# Patient Record
Sex: MALE | Race: WHITE | NOT HISPANIC OR LATINO | Employment: FULL TIME | URBAN - METROPOLITAN AREA
[De-identification: names, ages, dates, MRNs, and addresses within clinical notes are randomized per-mention and may not be internally consistent; named-entity substitution may affect disease eponyms.]

---

## 2018-01-11 ENCOUNTER — OFFICE VISIT (OUTPATIENT)
Dept: URGENT CARE | Facility: CLINIC | Age: 28
End: 2018-01-11
Payer: COMMERCIAL

## 2018-01-11 VITALS
HEIGHT: 77 IN | RESPIRATION RATE: 16 BRPM | TEMPERATURE: 98.2 F | OXYGEN SATURATION: 98 % | BODY MASS INDEX: 24.21 KG/M2 | SYSTOLIC BLOOD PRESSURE: 124 MMHG | WEIGHT: 205 LBS | DIASTOLIC BLOOD PRESSURE: 76 MMHG | HEART RATE: 72 BPM

## 2018-01-11 DIAGNOSIS — K52.9 GASTROENTERITIS: Primary | ICD-10-CM

## 2018-01-11 PROCEDURE — 99204 OFFICE O/P NEW MOD 45 MIN: CPT | Performed by: PHYSICIAN ASSISTANT

## 2018-01-11 RX ORDER — PROMETHAZINE HYDROCHLORIDE 25 MG/1
25 TABLET ORAL EVERY 6 HOURS PRN
Qty: 30 TAB | Refills: 0 | Status: SHIPPED | OUTPATIENT
Start: 2018-01-11 | End: 2023-06-06

## 2018-01-11 RX ORDER — DICYCLOMINE HCL 20 MG
20 TABLET ORAL EVERY 6 HOURS
Qty: 20 TAB | Refills: 0 | Status: SHIPPED | OUTPATIENT
Start: 2018-01-11 | End: 2018-01-16

## 2018-01-11 NOTE — PROGRESS NOTES
Subjective:      PT is a 27 y.o. male who presents with Emesis (diarrhea since monday, usually in am and pm )            HPI  This is a new problem. The current episode started in the past 4 days. The problem occurs 2 to 4 times per day. The problem has been gradually worsening. The stool consistency is described as watery. The patient states that diarrhea awakens him from sleep. Nothing aggravates the symptoms. Risk factors include suspect food intake at [facility]. He has tried anti-motility drug for the symptoms. The treatment provided no relief. There is no history of bowel resection, inflammatory bowel disease, irritable bowel syndrome, malabsorption, a recent abdominal surgery or short gut syndrome.   Pt states for the last 4 days, they have had abd cramping, watery diarrhea, and nausea with vomiting. Pt denies blood or mucus in the stool. Pt suspects contaminated food as etiology. Pt states OTC Pepto is not helping.  Pt has not taken any Rx medications for this condition. PT states the pain is a 6/10, aching in nature and worse at night. Pt denies CP, SOB,  paresthesias, headaches, dizziness, change in vision, hives, or joint pain. The pt's medication list, problem list, and allergies have been evaluated and reviewed during today's visit.     PMH:  Negative per pt.      PSH:  Negative per pt.      Fam Hx:    Mother alive and well with no major medical  Issues        Soc HX:  Social History     Social History   • Marital status: Single     Spouse name: N/A   • Number of children: N/A   • Years of education: N/A     Occupational History   • Not on file.     Social History Main Topics   • Smoking status: Never Smoker   • Smokeless tobacco: Never Used   • Alcohol use No   • Drug use:      Types: Marijuana      Comment: medical marijuana    • Sexual activity: Not on file     Other Topics Concern   • Not on file     Social History Narrative   • No narrative on file         Medications:    Current  "Outpatient Prescriptions:   •  promethazine (PHENERGAN) 25 MG Tab, Take 1 Tab by mouth every 6 hours as needed for Nausea/Vomiting., Disp: 30 Tab, Rfl: 0  •  dicyclomine (BENTYL) 20 MG Tab, Take 1 Tab by mouth every 6 hours for 5 days., Disp: 20 Tab, Rfl: 0      Allergies:  Patient has no known allergies.    ROS  Constitutional: Negative for fever, chills and malaise/fatigue.   HENT: Negative for congestion and sore throat.    Eyes: Negative for blurred vision, double vision and photophobia.   Respiratory: Negative for cough and shortness of breath.    Cardiovascular: Negative for chest pain and palpitations.   Gastrointestinal: POS nausea, vomiting, abdominal pain, diarrhea.   Genitourinary: Negative for dysuria and flank pain.   Musculoskeletal: Negative for joint pain and myalgias.   Skin: Negative for itching and rash.   Neurological: Negative for dizziness, tingling and headaches.   Endo/Heme/Allergies: Does not bruise/bleed easily.   Psychiatric/Behavioral: Negative for depression. The patient is not nervous/anxious.           Objective:     /76   Pulse 72   Temp 36.8 °C (98.2 °F)   Resp 16   Ht 1.956 m (6' 5\")   Wt 93 kg (205 lb)   SpO2 98%   BMI 24.31 kg/m²      Physical Exam   Abdominal: Soft. Normal appearance. He exhibits no shifting dullness, no distension, no pulsatile liver, no fluid wave, no abdominal bruit, no ascites, no pulsatile midline mass and no mass. Bowel sounds are increased. There is no hepatosplenomegaly. There is generalized tenderness. There is no rigidity, no rebound, no guarding, no CVA tenderness, no tenderness at McBurney's point and negative Fuentes's sign. No hernia.             Constitutional: PT is oriented to person, place, and time. PT appears well-developed and well-nourished. No distress.   HENT:   Head: Normocephalic and atraumatic.   Mouth/Throat: Oropharynx is clear and moist. No oropharyngeal exudate.   Eyes: Conjunctivae normal and EOM are normal. Pupils are " equal, round, and reactive to light.   Neck: Normal range of motion. Neck supple. No thyromegaly present.   Cardiovascular: Normal rate, regular rhythm, normal heart sounds and intact distal pulses.  Exam reveals no gallop and no friction rub.    No murmur heard.  Pulmonary/Chest: Effort normal and breath sounds normal. No respiratory distress. PT has no wheezes. PT has no rales. Pt exhibits no tenderness.   Musculoskeletal: Normal range of motion. PT exhibits no edema and no tenderness.   Neurological: PT is alert and oriented to person, place, and time. PT has normal reflexes. No cranial nerve deficit.   Skin: Skin is warm and dry. No rash noted. PT is not diaphoretic. No erythema.       Psychiatric: PT has a normal mood and affect. PT behavior is normal. Judgment and thought content normal.          Assessment/Plan:     1. Gastroenteritis    - promethazine (PHENERGAN) 25 MG Tab; Take 1 Tab by mouth every 6 hours as needed for Nausea/Vomiting.  Dispense: 30 Tab; Refill: 0  - dicyclomine (BENTYL) 20 MG Tab; Take 1 Tab by mouth every 6 hours for 5 days.  Dispense: 20 Tab; Refill: 0    Rest, fluids encouraged.  Encouraged bland diet  AVS with medical info given.  Pt was in full understanding and agreement with the plan.  Follow-up as needed if symptoms worsen or fail to improve.

## 2018-01-11 NOTE — LETTER
January 11, 2018       Patient: David Meyer   YOB: 1990   Date of Visit: 1/11/2018         To Whom It May Concern:    It is my medical opinion that David Meyer may be excused from work for the date of 1/12/18.      If you have any questions or concerns, please don't hesitate to call [phone]          Sincerely,          Sarmad Urena P.A.-C.  Electronically Signed

## 2018-01-11 NOTE — PATIENT INSTRUCTIONS
Norovirus Infection  A norovirus infection is caused by exposure to a virus in a group of similar viruses (noroviruses). This type of infection causes inflammation in your stomach and intestines (gastroenteritis). Norovirus is the most common cause of gastroenteritis. It also causes food poisoning.  Anyone can get a norovirus infection. It spreads very easily (contagious). You can get it from contaminated food, water, surfaces, or other people. Norovirus is found in the stool or vomit of infected people. You can spread the infection as soon as you feel sick until 2 weeks after you recover.   Symptoms usually begin within 2 days after you become infected. Most norovirus symptoms affect the digestive system.  CAUSES  Norovirus infection is caused by contact with norovirus. You can catch norovirus if you:  · Eat or drink something contaminated with norovirus.  · Touch surfaces or objects contaminated with norovirus and then put your hand in your mouth.  · Have direct contact with an infected person who has symptoms.  · Share food, drink, or utensils with someone with who is sick with norovirus.  SIGNS AND SYMPTOMS  Symptoms of norovirus may include:  · Nausea.  · Vomiting.  · Diarrhea.  · Stomach cramps.  · Fever.  · Chills.  · Headache.  · Muscle aches.  · Tiredness.  DIAGNOSIS  Your health care provider may suspect norovirus based on your symptoms and physical exam. Your health care provider may also test a sample of your stool or vomit for the virus.   TREATMENT  There is no specific treatment for norovirus. Most people get better without treatment in about 2 days.  HOME CARE INSTRUCTIONS  · Replace lost fluids by drinking plenty of water or rehydration fluids containing important minerals called electrolytes. This prevents dehydration. Drink enough fluid to keep your urine clear or pale yellow.  · Do not prepare food for others while you are infected. Wait at least 3 days after recovering from the illness to do  that.  PREVENTION   · Wash your hands often, especially after using the toilet or changing a diaper.  · Wash fruits and vegetables thoroughly before preparing or serving them.  · Throw out any food that a sick person may have touched.  · Disinfect contaminated surfaces immediately after someone in the household has been sick. Use a bleach-based household .  · Immediately remove and wash soiled clothes or sheets.  SEEK MEDICAL CARE IF:  · Your vomiting, diarrhea, and stomach pain is getting worse.  · Your symptoms of norovirus do not go away after 2-3 days.  SEEK IMMEDIATE MEDICAL CARE IF:   You develop symptoms of dehydration that do not improve with fluid replacement. This may include:  · Excessive sleepiness.  · Lack of tears.  · Dry mouth.  · Dizziness when standing.  · Weak pulse.     This information is not intended to replace advice given to you by your health care provider. Make sure you discuss any questions you have with your health care provider.     Document Released: 03/09/2004 Document Revised: 01/08/2016 Document Reviewed: 05/28/2015  [employer] Interactive Patient Education ©2016 [employer]

## 2019-12-20 ENCOUNTER — OFFICE VISIT (OUTPATIENT)
Dept: URGENT CARE | Facility: CLINIC | Age: 29
End: 2019-12-20
Payer: COMMERCIAL

## 2019-12-20 VITALS
TEMPERATURE: 99.2 F | HEART RATE: 90 BPM | BODY MASS INDEX: 27.75 KG/M2 | WEIGHT: 235 LBS | RESPIRATION RATE: 12 BRPM | HEIGHT: 77 IN | SYSTOLIC BLOOD PRESSURE: 132 MMHG | DIASTOLIC BLOOD PRESSURE: 74 MMHG | OXYGEN SATURATION: 97 %

## 2019-12-20 DIAGNOSIS — J02.9 PHARYNGITIS, UNSPECIFIED ETIOLOGY: ICD-10-CM

## 2019-12-20 LAB
INT CON NEG: NEGATIVE
INT CON POS: POSITIVE
S PYO AG THROAT QL: NEGATIVE

## 2019-12-20 PROCEDURE — 99214 OFFICE O/P EST MOD 30 MIN: CPT | Performed by: PHYSICIAN ASSISTANT

## 2019-12-20 PROCEDURE — 87880 STREP A ASSAY W/OPTIC: CPT | Performed by: PHYSICIAN ASSISTANT

## 2019-12-20 ASSESSMENT — ENCOUNTER SYMPTOMS
NECK PAIN: 1
DIARRHEA: 0
CHILLS: 1
SWOLLEN GLANDS: 1
EYE PAIN: 0
SINUS PAIN: 0
FEVER: 1
MYALGIAS: 1
BLURRED VISION: 0
DIZZINESS: 0
TROUBLE SWALLOWING: 1
COUGH: 0
HEADACHES: 1
NAUSEA: 0
VOMITING: 0
ABDOMINAL PAIN: 0
SORE THROAT: 1
PALPITATIONS: 0
SHORTNESS OF BREATH: 0

## 2019-12-21 NOTE — PROGRESS NOTES
Subjective:      David Meyer is a 29 y.o. male who presents with Sore Throat (x2 days, took acetaminophen today @ 1430); Neck Pain; and Headache      Pharyngitis    This is a new problem. The current episode started in the past 7 days (2-3 days ago). The problem has been unchanged. Neither side of throat is experiencing more pain than the other. The maximum temperature recorded prior to his arrival was 101 - 101.9 F. The fever has been present for 1 to 2 days. The pain is moderate. Associated symptoms include headaches, a plugged ear sensation, neck pain, swollen glands and trouble swallowing. Pertinent negatives include no abdominal pain, congestion, coughing, diarrhea, ear pain, shortness of breath or vomiting. He has had exposure to strep. He has tried acetaminophen and NSAIDs for the symptoms. The treatment provided mild relief.       Review of Systems   Constitutional: Positive for chills, fever and malaise/fatigue.   HENT: Positive for sore throat and trouble swallowing. Negative for congestion, ear pain and sinus pain.    Eyes: Negative for blurred vision and pain.   Respiratory: Negative for cough and shortness of breath.    Cardiovascular: Negative for chest pain and palpitations.   Gastrointestinal: Negative for abdominal pain, diarrhea, nausea and vomiting.   Musculoskeletal: Positive for myalgias and neck pain.   Skin: Negative for rash.   Neurological: Positive for headaches. Negative for dizziness.       PMH:  has no past medical history on file.  MEDS:   Current Outpatient Medications:   •  promethazine (PHENERGAN) 25 MG Tab, Take 1 Tab by mouth every 6 hours as needed for Nausea/Vomiting., Disp: 30 Tab, Rfl: 0  ALLERGIES: No Known Allergies  SURGHX: History reviewed. No pertinent surgical history.  SOCHX:  reports that he has never smoked. He has never used smokeless tobacco. He reports current drug use. Drug: Marijuana. He reports that he does not drink alcohol.  FH: Family history was  "reviewed, no pertinent findings to report     Objective:     /74 (BP Location: Left arm, Patient Position: Sitting, BP Cuff Size: Adult long)   Pulse 90   Temp 37.3 °C (99.2 °F) (Temporal)   Resp 12   Ht 1.956 m (6' 5\")   Wt 106.6 kg (235 lb)   SpO2 97%   BMI 27.87 kg/m²      Physical Exam  Constitutional:       Appearance: He is well-developed.   HENT:      Head: Normocephalic and atraumatic.      Right Ear: Tympanic membrane, ear canal and external ear normal.      Left Ear: Tympanic membrane, ear canal and external ear normal.      Nose: Nose normal.      Mouth/Throat:      Lips: Pink.      Mouth: Mucous membranes are moist.      Pharynx: Uvula midline. Posterior oropharyngeal erythema present. No oropharyngeal exudate or uvula swelling.      Tonsils: No tonsillar exudate or tonsillar abscesses.   Eyes:      Conjunctiva/sclera: Conjunctivae normal.      Pupils: Pupils are equal, round, and reactive to light.   Neck:      Musculoskeletal: Normal range of motion.   Cardiovascular:      Rate and Rhythm: Normal rate and regular rhythm.      Heart sounds: Normal heart sounds. No murmur.   Pulmonary:      Effort: Pulmonary effort is normal.      Breath sounds: Normal breath sounds. No wheezing.   Lymphadenopathy:      Cervical: Cervical adenopathy present.   Skin:     General: Skin is warm and dry.      Capillary Refill: Capillary refill takes less than 2 seconds.   Neurological:      Mental Status: He is alert and oriented to person, place, and time.   Psychiatric:         Behavior: Behavior normal.         Judgment: Judgment normal.         POCT Rapid Strep A - Negative   Assessment/Plan:       1. Pharyngitis, unspecified etiology  - POCT Rapid Strep A  -Supportive care discussed to include salt water gargles, throat lozenges, and increased fluid intake          Differential Diagnosis, natural history, and supportive care discussed. Return to the Urgent Care or follow up with your PCP if symptoms fail to " resolve, or for any new or worsening symptoms. Emergency room precautions discussed. Patient and/or family appears understanding of information.

## 2021-01-29 ENCOUNTER — OFFICE VISIT (OUTPATIENT)
Dept: URGENT CARE | Facility: CLINIC | Age: 31
End: 2021-01-29
Payer: COMMERCIAL

## 2021-01-29 VITALS
SYSTOLIC BLOOD PRESSURE: 136 MMHG | HEART RATE: 71 BPM | RESPIRATION RATE: 20 BRPM | HEIGHT: 77 IN | WEIGHT: 245 LBS | OXYGEN SATURATION: 97 % | DIASTOLIC BLOOD PRESSURE: 78 MMHG | TEMPERATURE: 98.2 F | BODY MASS INDEX: 28.93 KG/M2

## 2021-01-29 DIAGNOSIS — J39.2 PHARYNGEAL SWELLING: ICD-10-CM

## 2021-01-29 PROCEDURE — 99213 OFFICE O/P EST LOW 20 MIN: CPT | Performed by: PHYSICIAN ASSISTANT

## 2021-01-29 RX ORDER — DIPHENHYDRAMINE HYDROCHLORIDE 50 MG/ML
50 INJECTION INTRAMUSCULAR; INTRAVENOUS ONCE
Status: COMPLETED | OUTPATIENT
Start: 2021-01-29 | End: 2021-01-29

## 2021-01-29 RX ADMIN — DIPHENHYDRAMINE HYDROCHLORIDE 50 MG: 50 INJECTION INTRAMUSCULAR; INTRAVENOUS at 16:35

## 2021-01-29 ASSESSMENT — ENCOUNTER SYMPTOMS
VOMITING: 0
STRIDOR: 0
SHORTNESS OF BREATH: 0
NAUSEA: 0
DIZZINESS: 0
COUGH: 0
WHEEZING: 0
ABDOMINAL PAIN: 0
HEADACHES: 0

## 2021-01-30 NOTE — PROGRESS NOTES
"Subjective:   David Meyre is a 30 y.o. male who presents for Pharyngitis (x 1 hour thorat swollen up)      HPI  30 y.o. 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 Denies difficulty breathing, stridor, or wheezing. No history of anaphylaxis or known allergens. He denies cough or sore throat. Denies other associated aggravating or alleviating factors.     Review of Systems   Constitutional: Negative for malaise/fatigue.   HENT: Negative for congestion.         Oral swelling   Respiratory: Negative for cough, shortness of breath, wheezing and stridor.    Gastrointestinal: Negative for abdominal pain, nausea and vomiting.   Neurological: Negative for dizziness and headaches.   Endo/Heme/Allergies: Negative for environmental allergies.   All other systems reviewed and are negative.      There is no problem list on file for this patient.    History reviewed. No pertinent surgical history.  Social History     Tobacco Use   • Smoking status: Smoker, Current Status Unknown   • Smokeless tobacco: Never Used   Substance Use Topics   • Alcohol use: No   • Drug use: Yes     Types: Marijuana     Comment: medical marijuana       History reviewed. No pertinent family history.   (Allergies, Medications, & Tobacco/Substance Use were reconciled by the Medical Assistant and reviewed by myself. The family history is prepopulated)     Objective:     /78 (BP Location: Right arm, Patient Position: Sitting, BP Cuff Size: Large adult)   Pulse 71   Temp 36.8 °C (98.2 °F) (Temporal)   Resp 20   Ht 1.956 m (6' 5\")   Wt 111 kg (245 lb)   SpO2 97%   BMI 29.05 kg/m²     Physical Exam  Vitals signs reviewed.   Constitutional:       General: He is not in acute distress.     Appearance: Normal appearance. He is well-developed.   HENT:      Head: Normocephalic and atraumatic.      Nose: Nose normal. "      Mouth/Throat:      Mouth: Mucous membranes are moist.      Pharynx: Oropharynx is clear.   Eyes:      Conjunctiva/sclera: Conjunctivae normal.   Neck:      Musculoskeletal: Normal range of motion and neck supple.   Cardiovascular:      Rate and Rhythm: Normal rate and regular rhythm.      Heart sounds: Normal heart sounds.   Pulmonary:      Effort: Pulmonary effort is normal. No respiratory distress.      Breath sounds: Normal breath sounds. No stridor. No wheezing.   Abdominal:      Palpations: Abdomen is soft.   Skin:     General: Skin is warm and dry.      Coloration: Skin is not pale.   Neurological:      General: No focal deficit present.      Mental Status: He is alert and oriented to person, place, and time.   Psychiatric:         Mood and Affect: Mood normal.         Behavior: Behavior normal.         Thought Content: Thought content normal.         Judgment: Judgment normal.         Assessment/Plan:     1. Pharyngeal swelling  diphenhydrAMINE (BENADRYL) injection 50 mg     Patient is in no acute respiratory distress on exam. He is speaking in full sentences and his lungs are clear to ausculation bilaterally. Vitals signs are stable and he is oxygenating at 97% on room air. He denies hx of anaphylaxis or any known allergies. Patient given 50mg IM benadryl for sensation or oropharyngeal swelling. He will have his significant other drive him home. Strict ED pre-cautions given for worsening symptoms or difficulty breathing/SOB.     Differential diagnosis, natural history, supportive care, and indications for immediate follow-up discussed.    Advised the patient to follow-up with the primary care physician for recheck, reevaluation, and consideration of further management.  Patient verbalized understanding of treatment plan and has no further questions regarding care.     Please note that this dictation was created using voice recognition software. I have made a reasonable attempt to correct obvious errors,  but I expect that there are errors of grammar and possibly content that I did not discover before finalizing the note.    This note was electronically signed by Ariana Vincent PA-C

## 2021-11-03 ENCOUNTER — HOSPITAL ENCOUNTER (OUTPATIENT)
Facility: MEDICAL CENTER | Age: 31
End: 2021-11-03
Attending: PHYSICIAN ASSISTANT
Payer: COMMERCIAL

## 2021-11-03 ENCOUNTER — OFFICE VISIT (OUTPATIENT)
Dept: URGENT CARE | Facility: CLINIC | Age: 31
End: 2021-11-03
Payer: COMMERCIAL

## 2021-11-03 VITALS
HEART RATE: 68 BPM | OXYGEN SATURATION: 96 % | DIASTOLIC BLOOD PRESSURE: 70 MMHG | TEMPERATURE: 98.5 F | BODY MASS INDEX: 27.87 KG/M2 | WEIGHT: 236 LBS | RESPIRATION RATE: 20 BRPM | SYSTOLIC BLOOD PRESSURE: 122 MMHG | HEIGHT: 77 IN

## 2021-11-03 DIAGNOSIS — J02.9 SORE THROAT: ICD-10-CM

## 2021-11-03 DIAGNOSIS — R09.81 NASAL CONGESTION: ICD-10-CM

## 2021-11-03 DIAGNOSIS — R51.9 ACUTE NONINTRACTABLE HEADACHE, UNSPECIFIED HEADACHE TYPE: ICD-10-CM

## 2021-11-03 LAB
EXTERNAL QUALITY CONTROL: NORMAL
INT CON NEG: NEGATIVE
INT CON POS: POSITIVE
S PYO AG THROAT QL: NEGATIVE
SARS-COV+SARS-COV-2 AG RESP QL IA.RAPID: NEGATIVE

## 2021-11-03 PROCEDURE — U0005 INFEC AGEN DETEC AMPLI PROBE: HCPCS

## 2021-11-03 PROCEDURE — U0003 INFECTIOUS AGENT DETECTION BY NUCLEIC ACID (DNA OR RNA); SEVERE ACUTE RESPIRATORY SYNDROME CORONAVIRUS 2 (SARS-COV-2) (CORONAVIRUS DISEASE [COVID-19]), AMPLIFIED PROBE TECHNIQUE, MAKING USE OF HIGH THROUGHPUT TECHNOLOGIES AS DESCRIBED BY CMS-2020-01-R: HCPCS

## 2021-11-03 PROCEDURE — 87880 STREP A ASSAY W/OPTIC: CPT | Mod: QW | Performed by: PHYSICIAN ASSISTANT

## 2021-11-03 PROCEDURE — 99213 OFFICE O/P EST LOW 20 MIN: CPT | Mod: CS | Performed by: PHYSICIAN ASSISTANT

## 2021-11-03 PROCEDURE — 87426 SARSCOV CORONAVIRUS AG IA: CPT | Mod: QW | Performed by: PHYSICIAN ASSISTANT

## 2021-11-03 ASSESSMENT — ENCOUNTER SYMPTOMS
SORE THROAT: 1
FEVER: 0
COUGH: 0
CHILLS: 0
HEADACHES: 1
SWOLLEN GLANDS: 0
VOMITING: 0
DIARRHEA: 0

## 2021-11-03 NOTE — PROGRESS NOTES
"Subjective     David Meyer is a 31 y.o. male who presents with Sore Throat (nasal congestion, painful to swallow  x last night )    Medications:    • promethazine Tabs    Allergies: Patient has no known allergies.    Problem List: David Meyer does not have a problem list on file.    Surgical History:  No past surgical history on file.    Past Social Hx: David Meyer  reports that he has been smoking cigarettes. He has never used smokeless tobacco. He reports previous drug use. Drug: Marijuana. He reports that he does not drink alcohol.     Past Family Hx:  David Meyer family history is not on file.     Problem list, medications, and allergies reviewed by myself today in Epic.          Patient presents with:  Sore Throat: nasal congestion, painful to swallow  x last night         URI   This is a new problem. The current episode started yesterday. The problem has been gradually worsening. There has been no fever. Associated symptoms include congestion, headaches and a sore throat. Pertinent negatives include no coughing, diarrhea, joint pain, swollen glands or vomiting. He has tried increased fluids for the symptoms. The treatment provided no relief.       Review of Systems   Constitutional: Negative for chills and fever.   HENT: Positive for congestion and sore throat.    Respiratory: Negative for cough.    Gastrointestinal: Negative for diarrhea and vomiting.   Musculoskeletal: Negative for joint pain.   Neurological: Positive for headaches.   All other systems reviewed and are negative.             Objective     /70 (BP Location: Left arm, Patient Position: Sitting, BP Cuff Size: Adult long)   Pulse 68   Temp 36.9 °C (98.5 °F) (Temporal)   Resp 20   Ht 1.956 m (6' 5\")   Wt 107 kg (236 lb)   SpO2 96%   BMI 27.99 kg/m²      Physical Exam  Vitals and nursing note reviewed.   Constitutional:       General: He is not in acute distress.     Appearance: Normal " appearance. He is well-developed and normal weight. He is not ill-appearing or toxic-appearing.   HENT:      Head: Normocephalic and atraumatic.      Right Ear: Tympanic membrane normal.      Left Ear: Tympanic membrane normal.      Nose: Congestion present. No rhinorrhea.      Mouth/Throat:      Lips: Pink.      Mouth: Mucous membranes are moist.      Pharynx: Oropharynx is clear. Uvula midline. No posterior oropharyngeal erythema.   Eyes:      Extraocular Movements: Extraocular movements intact.      Conjunctiva/sclera: Conjunctivae normal.      Pupils: Pupils are equal, round, and reactive to light.   Cardiovascular:      Rate and Rhythm: Normal rate and regular rhythm.      Pulses: Normal pulses.      Heart sounds: Normal heart sounds.   Pulmonary:      Effort: Pulmonary effort is normal.      Breath sounds: Normal breath sounds.   Abdominal:      General: Bowel sounds are normal.      Palpations: Abdomen is soft.   Musculoskeletal:         General: Normal range of motion.      Cervical back: Normal range of motion and neck supple.   Skin:     General: Skin is warm and dry.      Capillary Refill: Capillary refill takes less than 2 seconds.   Neurological:      General: No focal deficit present.      Mental Status: He is alert and oriented to person, place, and time.      Cranial Nerves: No cranial nerve deficit.      Motor: Motor function is intact.      Coordination: Coordination is intact.      Gait: Gait normal.   Psychiatric:         Mood and Affect: Mood normal.               Rapid strep: neg  Rapid covid: neg             Assessment & Plan        1. Sore throat  POCT Rapid Strep A    POCT SARS-COV Antigen GEORGES (Symptomatic Only)    COVID/SARS CoV-2 PCR   2. Nasal congestion  POCT Rapid Strep A    POCT SARS-COV Antigen GEORGES (Symptomatic Only)    COVID/SARS CoV-2 PCR   3. Acute nonintractable headache, unspecified headache type  POCT Rapid Strep A    POCT SARS-COV Antigen GEORGES (Symptomatic Only)    COVID/SARS  CoV-2 PCR             Per protocol for PUI/ISO patients, the patient was evaluated by me while I was wearing PPE.  Per CDC guidelines, patient has been instructed to self quarantine at home until test results are known.  IF positive, pt to remain at home for 10 days from onset of symptoms.  If negative, pt to remain at home until fever has resolved.       Discussed that I felt this was viral in nature. Did not see any evidence of a bacterial process. Discussed natural progression and sx care.    PT advised saltwater gargles/swishes  3-4 times daily until symptoms improve.     Motrin/Advil/Ibuprophen 600 mg every 6 hours as needed for pain or fever.    PT should follow up with PCP in 1-2 days for re-evaluation if symptoms have not improved.      Discussed red flags and reasons to return to UC or ED.      Pt and/or family verbalized understanding of diagnosis and follow up instructions and was offered informational handout on diagnosis.  PT discharged.     I have spent at least 30 minutes on the care of this patient.  This includes preparing for visit which includes review of previous visits if available in EMR, obtaining HPI, exam and evaluation of patient, ordering and independent interpretation of labs, imaging, tests, medical management, counseling, education and documentation.

## 2021-11-04 DIAGNOSIS — R09.81 NASAL CONGESTION: ICD-10-CM

## 2021-11-04 DIAGNOSIS — J02.9 SORE THROAT: ICD-10-CM

## 2021-11-04 DIAGNOSIS — R51.9 ACUTE NONINTRACTABLE HEADACHE, UNSPECIFIED HEADACHE TYPE: ICD-10-CM

## 2021-11-04 LAB
COVID ORDER STATUS COVID19: NORMAL
SARS-COV-2 RNA RESP QL NAA+PROBE: NOTDETECTED
SPECIMEN SOURCE: NORMAL

## 2023-06-06 ENCOUNTER — OFFICE VISIT (OUTPATIENT)
Dept: URGENT CARE | Facility: CLINIC | Age: 33
End: 2023-06-06
Payer: COMMERCIAL

## 2023-06-06 ENCOUNTER — HOSPITAL ENCOUNTER (OUTPATIENT)
Dept: LAB | Facility: MEDICAL CENTER | Age: 33
End: 2023-06-06
Attending: PHYSICIAN ASSISTANT
Payer: COMMERCIAL

## 2023-06-06 ENCOUNTER — APPOINTMENT (OUTPATIENT)
Dept: RADIOLOGY | Facility: IMAGING CENTER | Age: 33
End: 2023-06-06
Attending: PHYSICIAN ASSISTANT
Payer: COMMERCIAL

## 2023-06-06 VITALS
TEMPERATURE: 97.7 F | RESPIRATION RATE: 20 BRPM | DIASTOLIC BLOOD PRESSURE: 68 MMHG | OXYGEN SATURATION: 97 % | WEIGHT: 240 LBS | HEART RATE: 72 BPM | SYSTOLIC BLOOD PRESSURE: 128 MMHG | HEIGHT: 77 IN | BODY MASS INDEX: 28.34 KG/M2

## 2023-06-06 DIAGNOSIS — R07.9 CHEST PAIN, UNSPECIFIED TYPE: ICD-10-CM

## 2023-06-06 DIAGNOSIS — Z87.898 HISTORY OF CHEST PAIN: ICD-10-CM

## 2023-06-06 DIAGNOSIS — R42 LIGHT HEADEDNESS: ICD-10-CM

## 2023-06-06 LAB
ALBUMIN SERPL BCP-MCNC: 4.7 G/DL (ref 3.2–4.9)
ALBUMIN/GLOB SERPL: 2 G/DL
ALP SERPL-CCNC: 98 U/L (ref 30–99)
ALT SERPL-CCNC: 32 U/L (ref 2–50)
ANION GAP SERPL CALC-SCNC: 13 MMOL/L (ref 7–16)
AST SERPL-CCNC: 24 U/L (ref 12–45)
BASOPHILS # BLD AUTO: 0.4 % (ref 0–1.8)
BASOPHILS # BLD: 0.03 K/UL (ref 0–0.12)
BILIRUB SERPL-MCNC: 0.5 MG/DL (ref 0.1–1.5)
BUN SERPL-MCNC: 22 MG/DL (ref 8–22)
CALCIUM ALBUM COR SERPL-MCNC: 9.2 MG/DL (ref 8.5–10.5)
CALCIUM SERPL-MCNC: 9.8 MG/DL (ref 8.4–10.2)
CHLORIDE SERPL-SCNC: 103 MMOL/L (ref 96–112)
CO2 SERPL-SCNC: 23 MMOL/L (ref 20–33)
CREAT SERPL-MCNC: 0.99 MG/DL (ref 0.5–1.4)
EOSINOPHIL # BLD AUTO: 0.12 K/UL (ref 0–0.51)
EOSINOPHIL NFR BLD: 1.6 % (ref 0–6.9)
ERYTHROCYTE [DISTWIDTH] IN BLOOD BY AUTOMATED COUNT: 39.7 FL (ref 35.9–50)
GFR SERPLBLD CREATININE-BSD FMLA CKD-EPI: 105 ML/MIN/1.73 M 2
GLOBULIN SER CALC-MCNC: 2.4 G/DL (ref 1.9–3.5)
GLUCOSE BLD-MCNC: 92 MG/DL (ref 65–99)
GLUCOSE SERPL-MCNC: 98 MG/DL (ref 65–99)
HCT VFR BLD AUTO: 48.4 % (ref 42–52)
HGB BLD-MCNC: 16.6 G/DL (ref 14–18)
IMM GRANULOCYTES # BLD AUTO: 0.03 K/UL (ref 0–0.11)
IMM GRANULOCYTES NFR BLD AUTO: 0.4 % (ref 0–0.9)
LYMPHOCYTES # BLD AUTO: 1.95 K/UL (ref 1–4.8)
LYMPHOCYTES NFR BLD: 25.5 % (ref 22–41)
MCH RBC QN AUTO: 30 PG (ref 27–33)
MCHC RBC AUTO-ENTMCNC: 34.3 G/DL (ref 32.3–36.5)
MCV RBC AUTO: 87.5 FL (ref 81.4–97.8)
MONOCYTES # BLD AUTO: 0.37 K/UL (ref 0–0.85)
MONOCYTES NFR BLD AUTO: 4.8 % (ref 0–13.4)
NEUTROPHILS # BLD AUTO: 5.16 K/UL (ref 1.82–7.42)
NEUTROPHILS NFR BLD: 67.3 % (ref 44–72)
NRBC # BLD AUTO: 0 K/UL
NRBC BLD-RTO: 0 /100 WBC (ref 0–0.2)
PLATELET # BLD AUTO: 174 K/UL (ref 164–446)
PMV BLD AUTO: 10 FL (ref 9–12.9)
POTASSIUM SERPL-SCNC: 3.8 MMOL/L (ref 3.6–5.5)
PROT SERPL-MCNC: 7.1 G/DL (ref 6–8.2)
RBC # BLD AUTO: 5.53 M/UL (ref 4.7–6.1)
SODIUM SERPL-SCNC: 139 MMOL/L (ref 135–145)
TSH SERPL DL<=0.005 MIU/L-ACNC: 0.85 UIU/ML (ref 0.38–5.33)
WBC # BLD AUTO: 7.7 K/UL (ref 4.8–10.8)

## 2023-06-06 PROCEDURE — 99215 OFFICE O/P EST HI 40 MIN: CPT | Performed by: PHYSICIAN ASSISTANT

## 2023-06-06 PROCEDURE — 80053 COMPREHEN METABOLIC PANEL: CPT

## 2023-06-06 PROCEDURE — 3078F DIAST BP <80 MM HG: CPT | Performed by: PHYSICIAN ASSISTANT

## 2023-06-06 PROCEDURE — 3074F SYST BP LT 130 MM HG: CPT | Performed by: PHYSICIAN ASSISTANT

## 2023-06-06 PROCEDURE — 93000 ELECTROCARDIOGRAM COMPLETE: CPT | Performed by: PHYSICIAN ASSISTANT

## 2023-06-06 PROCEDURE — 82962 GLUCOSE BLOOD TEST: CPT | Performed by: PHYSICIAN ASSISTANT

## 2023-06-06 PROCEDURE — 71046 X-RAY EXAM CHEST 2 VIEWS: CPT | Mod: TC,FY | Performed by: RADIOLOGY

## 2023-06-06 PROCEDURE — 36415 COLL VENOUS BLD VENIPUNCTURE: CPT

## 2023-06-06 PROCEDURE — 84443 ASSAY THYROID STIM HORMONE: CPT

## 2023-06-06 PROCEDURE — 85025 COMPLETE CBC W/AUTO DIFF WBC: CPT

## 2023-06-06 RX ORDER — IBUPROFEN 200 MG
200 TABLET ORAL EVERY 6 HOURS PRN
COMMUNITY

## 2023-06-06 ASSESSMENT — ENCOUNTER SYMPTOMS
CHILLS: 0
DOUBLE VISION: 0
SHORTNESS OF BREATH: 0
BLURRED VISION: 0
NAUSEA: 0
DIARRHEA: 0
PALPITATIONS: 0
ABDOMINAL PAIN: 0
FEVER: 0
LOSS OF CONSCIOUSNESS: 0
VOMITING: 0
HEADACHES: 0

## 2023-06-06 NOTE — PROGRESS NOTES
Subjective:   David Meyer is a 32 y.o. male who presents for Dizziness (X4 days, light headed: flushed, chest pain once in a while, Rt side of rib pain, SOB, lower back pain, neck Lt side of shoulder/neck pain )        Patient presents with concerns of intermittent lightheadedness for the last 4 days.  Symptoms come and go at random.  Symptoms are not better or worse at certain times of the day.  Sometimes he feels slightly short of breath when symptoms are occurring.  He also experiences occasional jaw pain as well as intermittent achiness in his low backs and hips.  He experienced a couple episodes of right chest wall/rib pain as well.  He is not currently experiencing sx. He endorses general malaise and fatigue but denies nausea, vomiting, fevers, chills, abdominal pain, palpitations, difficulty breathing, headaches, presyncope, syncope, leg pain and leg swelling, personal or family history of a coagulopathy, personal history of cardiac issues, cough and cold symptoms.  Patient states that he drank a [employer] coffee drink a couple days ago when he was experiencing symptoms and this seemed to resolve his symptoms.  Patient works in a [employer] and he estimates that he drinks a gallon of water daily.  He has history of migraines but has not been experiencing migraine headaches lately.  Family history significant for type 2 diabetes and ACS of maternal and paternal grandfather.  Both the patient's parents are alive and mom has history of some sort of cardiac arrhythmia-patient is unsure of the exact details of this.  No personal or family history of autoimmune disease.          Review of Systems   Constitutional:  Positive for malaise/fatigue. Negative for chills and fever.   HENT: Negative.     Eyes:  Negative for blurred vision and double vision.   Respiratory:  Negative for shortness of breath (not currently).    Cardiovascular:  Negative for chest pain (not currently), palpitations and leg swelling.  "  Gastrointestinal:  Negative for abdominal pain, diarrhea, nausea and vomiting.   Neurological:  Negative for loss of consciousness and headaches.       PMH:  has no past medical history on file.  MEDS:   Current Outpatient Medications:     ibuprofen (MOTRIN) 200 MG Tab, Take 200 mg by mouth every 6 hours as needed., Disp: , Rfl:   ALLERGIES: No Known Allergies  SURGHX: History reviewed. No pertinent surgical history.  SOCHX:  reports that he has been smoking cigarettes. He has never used smokeless tobacco. He reports that he does not currently use drugs after having used the following drugs: Marijuana. He reports that he does not drink alcohol.  FH: Family history was reviewed, no pertinent findings to report   Objective:   /68 (BP Location: Left arm, Patient Position: Sitting, BP Cuff Size: Adult)   Pulse 72   Temp 36.5 °C (97.7 °F) (Temporal)   Resp 20   Ht 1.956 m (6' 5\")   Wt 109 kg (240 lb)   SpO2 97%   BMI 28.46 kg/m²   Physical Exam  Vitals reviewed.   Constitutional:       General: He is not in acute distress.     Appearance: Normal appearance. He is well-developed. He is not toxic-appearing.   HENT:      Head: Normocephalic and atraumatic.      Right Ear: External ear normal.      Left Ear: External ear normal.      Nose: Nose normal.   Neck:      Comments: No JVD or thyromegaly.  Cardiovascular:      Rate and Rhythm: Normal rate and regular rhythm.      Heart sounds: Normal heart sounds, S1 normal and S2 normal.      Comments: No murmurs, rubs, gallops.  No lower extremity edema bilaterally.  Pulmonary:      Effort: Pulmonary effort is normal. No respiratory distress.      Breath sounds: Normal breath sounds. No stridor. No decreased breath sounds, wheezing, rhonchi or rales.   Chest:      Comments: Chest wall nontender to palpation.  Skin:     General: Skin is dry.   Neurological:      Comments: AO x3.  Patient's speech is fluid and movements are coordinated.  Gait within normal limits.  " Cranial nerves II through XII grossly intact.   Psychiatric:         Speech: Speech normal.         Behavior: Behavior normal.             EKG:  Comparison: None  Rhythm: Sinus rhythm  Ectopy: None  Rate: 69  QRS Axis: Normal  Conduction: Normal  ST segment: No ST segment elevation or depression noted  T waves: WNL  Clinical impression: sinus rhythm    CXR:  FINDINGS:  The lungs are clear.  The cardiac silhouette is normal in size.  No effusions or pneumothoraces are present.  There are no significant osseous abnormalities.  The visualized portions of the upper abdomen are within normal limits.        IMPRESSION:     NEGATIVE TWO VIEWS OF THE CHEST.        Assessment/Plan:   1. History of chest pain  - DX-CHEST-2 VIEWS; Future  - EKG - Clinic Performed  - Referral to establish with [facility] PCP  - CBC WITH DIFFERENTIAL; Future  - Comp Metabolic Panel; Future  - TSH WITH REFLEX TO FT4; Future    2. Light headedness  - POCT Glucose  - Referral to establish with [facility] PCP  - CBC WITH DIFFERENTIAL; Future  - Comp Metabolic Panel; Future  - TSH WITH REFLEX TO FT4; Future    Other orders  - ibuprofen (MOTRIN) 200 MG Tab; Take 200 mg by mouth every 6 hours as needed.    Patient is well-appearing and vital signs are stable.  No evidence of cardiac arrhythmia, ischemia, pericarditis, myocarditis, WPW, Brugada or other abnormality on EKG.  Chest x-ray is within normal limits.  Blood glucose is 92 today in clinic.  Patient's PERC score is 0 making something like a PE as the cause of his symptoms unlikely.  Patient advised that the cause of his symptoms is unclear but he does not appear to be in any immediate danger.  Will obtain labs to further evaluate and place an urgent referral to have him follow-up with primary care within 5 to 7 days for reevaluation and further managment.    Patient also advised that symptoms could be secondary to a process that is not yet fully manifested.  I would like him to have a low threshold to be  reevaluated with any new or worsening symptoms.  We also discussed red flag signs and symptoms at length and he was given strict ED precautions.    All questions and concerns addressed.  Patient is comfortable with additional work-up and follow-up plan.  He verbalized good understanding of return and ED precautions.    My total time spent caring for the patient on the day of the encounter was 40 minutes.   This does not include time spent on separately billable procedures/tests.

## 2023-06-06 NOTE — LETTER
June 6, 2023    To Whom It May Concern:         This is confirmation that David Meyer attended his scheduled appointment with Kamaljit Bazan P.A.-C. on 6/06/23. Please excuse him on 6/07/23         If you have any questions please do not hesitate to call me at the phone number listed below.    Sincerely,          Kamaljit Bazan P.A.-C.   [phone]

## 2023-06-06 NOTE — RESULT ENCOUNTER NOTE
Devang Hernandez,    Your electrolytes were normal.  Your kidneys and liver are functioning normally, as well.  There are no signs of anemia or infection.  I am still waiting on your thyroid test.    Kind regards,  MADISYN

## 2023-06-07 ENCOUNTER — TELEPHONE (OUTPATIENT)
Dept: HEALTH INFORMATION MANAGEMENT | Facility: OTHER | Age: 33
End: 2023-06-07

## 2024-01-29 ENCOUNTER — OFFICE VISIT (OUTPATIENT)
Dept: URGENT CARE | Facility: CLINIC | Age: 34
End: 2024-01-29
Payer: COMMERCIAL

## 2024-01-29 ENCOUNTER — APPOINTMENT (OUTPATIENT)
Dept: RADIOLOGY | Facility: IMAGING CENTER | Age: 34
End: 2024-01-29
Payer: COMMERCIAL

## 2024-01-29 VITALS
OXYGEN SATURATION: 97 % | RESPIRATION RATE: 18 BRPM | SYSTOLIC BLOOD PRESSURE: 124 MMHG | HEART RATE: 69 BPM | HEIGHT: 77 IN | TEMPERATURE: 98.2 F | DIASTOLIC BLOOD PRESSURE: 90 MMHG | BODY MASS INDEX: 30.7 KG/M2 | WEIGHT: 260 LBS

## 2024-01-29 DIAGNOSIS — S69.91XA INJURY OF RIGHT HAND, INITIAL ENCOUNTER: ICD-10-CM

## 2024-01-29 DIAGNOSIS — S62.306A UNSPECIFIED FRACTURE OF FIFTH METACARPAL BONE, RIGHT HAND, INITIAL ENCOUNTER FOR CLOSED FRACTURE: ICD-10-CM

## 2024-01-29 PROCEDURE — 3080F DIAST BP >= 90 MM HG: CPT

## 2024-01-29 PROCEDURE — 90714 TD VACC NO PRESV 7 YRS+ IM: CPT

## 2024-01-29 PROCEDURE — 73130 X-RAY EXAM OF HAND: CPT | Mod: TC,FY,RT

## 2024-01-29 PROCEDURE — 90471 IMMUNIZATION ADMIN: CPT

## 2024-01-29 PROCEDURE — 99213 OFFICE O/P EST LOW 20 MIN: CPT | Mod: 25

## 2024-01-29 PROCEDURE — 3074F SYST BP LT 130 MM HG: CPT

## 2024-01-29 RX ORDER — KETOROLAC TROMETHAMINE 30 MG/ML
15 INJECTION, SOLUTION INTRAMUSCULAR; INTRAVENOUS ONCE
Status: COMPLETED | OUTPATIENT
Start: 2024-01-29 | End: 2024-01-29

## 2024-01-29 RX ADMIN — KETOROLAC TROMETHAMINE 15 MG: 30 INJECTION, SOLUTION INTRAMUSCULAR; INTRAVENOUS at 12:02

## 2024-01-29 ASSESSMENT — FIBROSIS 4 INDEX: FIB4 SCORE: 0.76

## 2024-01-29 NOTE — PROGRESS NOTES
Subjective:   David Meyer is a very pleasant 33 y.o. male who presents for:    Chief Complaint   Patient presents with    Hand Injury     Patient states hit metal bumper 2 hrs ago, possible broken hand, hurts to move, cannot close hand       HPI:    The patient reports pain on the lateral aspect of the right hand, right wrist. The pain is worsened with movement and relieved with rest. Denies numbness or tingling. Decreased ROM with flexion of the fingers. FROM of the right wrist. He has used ibuprofen and ice, which has not improved the pain or swelling. He is right handed.    ROS:    ROS    Medications:      Current Outpatient Medications   Medication Sig    ibuprofen (MOTRIN) 200 MG Tab Take 200 mg by mouth every 6 hours as needed. (Patient not taking: Reported on 1/29/2024)       Allergies:     No Known Allergies    Problem List:     There is no problem list on file for this patient.      Surgical History:    No past surgical history on file.    Past Social Hx:     Social History     Socioeconomic History    Marital status: Single   Tobacco Use    Smoking status: Former     Types: Cigarettes    Smokeless tobacco: Never   Vaping Use    Vaping Use: Some days    Substances: THC   Substance and Sexual Activity    Alcohol use: No    Drug use: Yes     Types: Marijuana     Comment: medical marijuana         Past Family Hx:      History reviewed. No pertinent family history.    Problem list, medications, and allergies reviewed by myself today in Epic.     Objective:     Vitals:    01/29/24 1054   BP: (!) 124/90   Pulse: 69   Resp: 18   Temp: 36.8 °C (98.2 °F)   SpO2: 97%       Physical Exam  Vitals reviewed.   Constitutional:       General: He is not in acute distress.     Appearance: Normal appearance. He is not ill-appearing, toxic-appearing or diaphoretic.   HENT:      Head: Normocephalic and atraumatic.   Eyes:      Extraocular Movements: Extraocular movements intact.      Pupils: Pupils are equal, round,  and reactive to light.   Cardiovascular:      Pulses: Normal pulses.   Pulmonary:      Effort: Pulmonary effort is normal.   Musculoskeletal:         General: Normal range of motion.        Hands:       Cervical back: Normal range of motion.      Comments: TTP over the anterior lateral aspect of the right hand on the ulnar surface distal to the base of the fifth finger.  There is moderate swelling.  No gross deformity, overlying skin changes, breaks in the skin.  Full ROM of extension of all fingers.  Mild decrease in flexion of the hand secondary to swelling and pain.  Neurovascularly intact proximal and distal to the site of injury with greater than 2-second capillary refill and 2+ radial pulse.   Skin:     General: Skin is warm and dry.   Neurological:      General: No focal deficit present.      Mental Status: He is alert and oriented to person, place, and time. Mental status is at baseline.   Psychiatric:         Mood and Affect: Mood normal.         Behavior: Behavior normal.         Thought Content: Thought content normal.         Judgment: Judgment normal.     X-ray images viewed and interpreted by APRN, confirmed by radiology:        RADIOLOGY RESULTS   DX-HAND 3+ RIGHT    Result Date: 1/29/2024 1/29/2024 11:34 AM HISTORY/REASON FOR EXAM:  Pain/Deformity Following Trauma; R lateral pain and swelling after hitting a steel beam. TECHNIQUE/EXAM DESCRIPTION AND NUMBER OF VIEWS:  3 views of the RIGHT hand. COMPARISON: None FINDINGS: Acute transversely oriented mid fifth metacarpal diaphysis fracture with mild palmar angulation distal fracture. No additional fracture detected.     Fifth metacarpal fracture.            Assessment/Plan:     Diagnosis and associated orders:     1. Injury of right hand, initial encounter  - DX-HAND 3+ RIGHT; Future  - ketorolac (Toradol) injection 15 mg    2. Unspecified fracture of fifth metacarpal bone, right hand, initial encounter for closed fracture  - TD Preservative Free  =>8yo IM  - Splint Application  - Referral to Orthopedics          Comments/MDM:     X-ray demonstrates a closed fracture of the fifth metacarpal bone of the right hand  [facility] Ortho-Glass splint applied in clinic  The patient is neurovascularly intact proximal and distal to the site of injury.  Neurovascular status remains intact after application of Ortho-Glass  Stat referral placed to orthopedic hand specialist for evaluation and treatment  Pain management strategies discussed  Care of Ortho-Glass,  including keeping the splint dry, signs and symptoms of NV compromise reviewed   15 mg IM Toradol given.  Patient tolerated this well.   Work note provided  TD updated         All questions answered. Patient verbalized understanding and is in agreement with this plan of care.     If symptoms are worsening or not improving in 3-5 days, follow-up with PCP or return to UC. Differential diagnosis, natural history, and supportive care discussed. AVS handout given and reviewed with patient. Patient educated on red flags and when to seek treatment back in ED or UC.     I personally reviewed prior external notes and test results pertinent to today's visit.  I have independently reviewed and interpreted all diagnostics ordered during this urgent care visit.     This dictation has been created using voice recognition software. The accuracy of the dictation is limited by the abilities of the software. I expect there may be some errors of grammar and possibly content. I made every attempt to manually correct the errors within my dictation. However, errors related to voice recognition software may still exist and should be interpreted within the appropriate context.    This note was electronically signed by MALIA Kyle

## 2024-01-29 NOTE — LETTER
January 29, 2024    To Whom It May Concern:         This is confirmation that David Meyer attended his scheduled appointment with MALIA Peoples on 1/29/24. The patient sustained a right hand fracture. Please allow him to perform light duty responsibilities. No pushing, pulling, lifting, fine motor manipulations of the right hand. He will be seeing an orthopedic provider and will need clearance prior to resume regular job responsibilities.          If you have any questions please do not hesitate to call me at the phone number listed below.    Sincerely,          KWAME Peoples.  [phone]

## 2024-06-15 ENCOUNTER — HOSPITAL ENCOUNTER (EMERGENCY)
Facility: MEDICAL CENTER | Age: 34
End: 2024-06-15
Attending: STUDENT IN AN ORGANIZED HEALTH CARE EDUCATION/TRAINING PROGRAM
Payer: COMMERCIAL

## 2024-06-15 VITALS
WEIGHT: 255.29 LBS | HEIGHT: 77 IN | RESPIRATION RATE: 19 BRPM | HEART RATE: 74 BPM | SYSTOLIC BLOOD PRESSURE: 131 MMHG | TEMPERATURE: 97.8 F | OXYGEN SATURATION: 94 % | DIASTOLIC BLOOD PRESSURE: 71 MMHG | BODY MASS INDEX: 30.14 KG/M2

## 2024-06-15 DIAGNOSIS — R51.9 ACUTE NONINTRACTABLE HEADACHE, UNSPECIFIED HEADACHE TYPE: ICD-10-CM

## 2024-06-15 DIAGNOSIS — R11.0 NAUSEA: ICD-10-CM

## 2024-06-15 PROCEDURE — 96375 TX/PRO/DX INJ NEW DRUG ADDON: CPT

## 2024-06-15 PROCEDURE — 700105 HCHG RX REV CODE 258: Performed by: STUDENT IN AN ORGANIZED HEALTH CARE EDUCATION/TRAINING PROGRAM

## 2024-06-15 PROCEDURE — 96374 THER/PROPH/DIAG INJ IV PUSH: CPT

## 2024-06-15 PROCEDURE — 99284 EMERGENCY DEPT VISIT MOD MDM: CPT

## 2024-06-15 PROCEDURE — 700111 HCHG RX REV CODE 636 W/ 250 OVERRIDE (IP): Mod: JZ | Performed by: STUDENT IN AN ORGANIZED HEALTH CARE EDUCATION/TRAINING PROGRAM

## 2024-06-15 RX ORDER — METOCLOPRAMIDE HYDROCHLORIDE 5 MG/ML
10 INJECTION INTRAMUSCULAR; INTRAVENOUS ONCE
Status: COMPLETED | OUTPATIENT
Start: 2024-06-15 | End: 2024-06-15

## 2024-06-15 RX ORDER — KETOROLAC TROMETHAMINE 15 MG/ML
15 INJECTION, SOLUTION INTRAMUSCULAR; INTRAVENOUS ONCE
Status: COMPLETED | OUTPATIENT
Start: 2024-06-15 | End: 2024-06-15

## 2024-06-15 RX ORDER — ONDANSETRON 2 MG/ML
4 INJECTION INTRAMUSCULAR; INTRAVENOUS ONCE
Status: COMPLETED | OUTPATIENT
Start: 2024-06-15 | End: 2024-06-15

## 2024-06-15 RX ORDER — SODIUM CHLORIDE 9 MG/ML
1000 INJECTION, SOLUTION INTRAVENOUS ONCE
Status: COMPLETED | OUTPATIENT
Start: 2024-06-15 | End: 2024-06-15

## 2024-06-15 RX ADMIN — ONDANSETRON 4 MG: 2 INJECTION INTRAMUSCULAR; INTRAVENOUS at 01:07

## 2024-06-15 RX ADMIN — SODIUM CHLORIDE 1000 ML: 9 INJECTION, SOLUTION INTRAVENOUS at 01:06

## 2024-06-15 RX ADMIN — KETOROLAC TROMETHAMINE 15 MG: 15 INJECTION, SOLUTION INTRAMUSCULAR; INTRAVENOUS at 01:09

## 2024-06-15 RX ADMIN — METOCLOPRAMIDE 10 MG: 5 INJECTION, SOLUTION INTRAMUSCULAR; INTRAVENOUS at 01:07

## 2024-06-15 ASSESSMENT — FIBROSIS 4 INDEX: FIB4 SCORE: 0.76

## 2024-06-15 NOTE — ED PROVIDER NOTES
"CHIEF COMPLAINT  Chief Complaint   Patient presents with    Headache     Pt complains of headache x5 hours. \"Its the worst one I've ever head\" Pt reports they are sensitive to the light and smell, and is also experiencing nausea and dizziness    Nausea    Dizziness       LIMITATION TO HISTORY   Select:     HPI    David Meyer is a 33 y.o. male who presents to the Emergency Department for evaluation of throbbing gradually worsening 10 out of 10 headache patient reports sensitivity to light and smell and he reports some nausea does report a history of migraine headaches his previously had an MRI that he states is normal    OUTSIDE HISTORIAN(S):  Select:    EXTERNAL RECORDS REVIEWED  Select:       PAST MEDICAL HISTORY  History reviewed. No pertinent past medical history.  .    SURGICAL HISTORY  History reviewed. No pertinent surgical history.      FAMILY HISTORY  History reviewed. No pertinent family history.       SOCIAL HISTORY  Social History     Socioeconomic History    Marital status: Single     Spouse name: Not on file    Number of children: Not on file    Years of education: Not on file    Highest education level: Not on file   Occupational History    Not on file   Tobacco Use    Smoking status: Former     Types: Cigarettes    Smokeless tobacco: Never   Vaping Use    Vaping status: Some Days    Substances: THC   Substance and Sexual Activity    Alcohol use: No    Drug use: Yes     Types: Marijuana     Comment: medical marijuana     Sexual activity: Not on file   Other Topics Concern    Not on file   Social History Narrative    Not on file     Social Determinants of Health     Financial Resource Strain: Not on file   Food Insecurity: Not on file   Transportation Needs: Not on file   Physical Activity: Not on file   Stress: Not on file   Social Connections: Not on file   Intimate Partner Violence: Not on file   Housing Stability: Not on file         CURRENT MEDICATIONS  No current facility-administered " "medications on file prior to encounter.     Current Outpatient Medications on File Prior to Encounter   Medication Sig Dispense Refill    ibuprofen (MOTRIN) 200 MG Tab Take 200 mg by mouth every 6 hours as needed. (Patient not taking: Reported on 1/29/2024)             ALLERGIES  No Known Allergies    PHYSICAL EXAM  VITAL SIGNS:/71   Pulse 74   Temp 36.6 °C (97.8 °F) (Temporal)   Resp 19   Ht 1.956 m (6' 5\")   Wt 116 kg (255 lb 4.7 oz)   SpO2 94%   BMI 30.27 kg/m²       GENERAL: Awake and alert  HEAD: Normocephalic and atraumatic  NECK: Normal range of motion, without meningismus  EYES: Pupils Equal, Round, Reactive to Light, extraocular movements intact, conjunctiva white  ENT: Mucous membranes moist, oropharynx clear  PULMONARY: Normal effort, clear to auscultation  CARDIOVASCULAR: No murmurs, clicks or rubs, peripheral pulses 2+  ABDOMINAL: Soft, non-tender, no guarding or rigidity present, no pulsatile masses  BACK: no midline tenderness, no costovertebral tenderness  NEUROLOGICAL: Grossly non-focal neurological examination, speech normal, gait normal  EXTREMITIES: No edema, normal to inspection  SKIN: Warm and dry.  PSYCHIATRIC: Affect is appropriate        COURSE & MEDICAL DECISION MAKING    ED COURSE:        INTERVENTIONS BY ME:  Medications   metoclopramide (Reglan) injection 10 mg (10 mg Intravenous Given 6/15/24 0107)   ketorolac (Toradol) 15 MG/ML injection 15 mg (15 mg Intravenous Given 6/15/24 0109)   ondansetron (Zofran) syringe/vial injection 4 mg (4 mg Intravenous Given 6/15/24 0107)   NS (Bolus) 0.9 % infusion 1,000 mL (0 mL Intravenous Stopped 6/15/24 0148)       Response on recheck:  1:10 AM discussed obtaining a CT scan or imaging of the patient's brain at this time will attempt trial of analgesia and reassess the patient's symptoms do not improve will consider imaging  2 AM patient with reassuring repeat physical examination, symptoms have improved since arrival, we discussed the " plan of care and the patient was agreeable. I discussed the plan of care with the patient and discussed reasons to return to the emergency department. We agreed that discharge was appropriate after today´s visit.    INITIAL ASSESSMENT, COURSE AND PLAN  Care Narrative:   Upon my interpretation of this patient´s symptomatology and examination today, this patient's presentation is not consistent with subarachnoid hemorrhage, meningitis, intracranial tumor, preeclampsia, closed angle glaucoma, temporal arteritis or any other emergencies requiring immediate hospitalization.  This patients pain markedly after analgesics and met criterion for safe discharge.   They were instructed to return to the ED for any new alarming more severe symptoms.           ADDITIONAL PROBLEM LIST    DISPOSITION AND DISCUSSIONS  Discussion of management with other QHP or appropriate source(s): None       Escalation of care considered, and ultimately not performed:diagnostic imaging      Decision tools and prescription drugs considered including, but not limited to: Discussed prescription medication    FINAL DIAGNOSIS  1. Nausea    2. Acute nonintractable headache, unspecified headache type             Electronically signed by: Lino Nash DO ,2:09 AM 06/15/24

## 2024-06-15 NOTE — ED TRIAGE NOTES
".  Chief Complaint   Patient presents with    Headache     Pt complains of headache x5 hours. \"Its the worst one I've ever head\" Pt reports they are sensitive to the light and smell, and is also experiencing nausea and dizziness    Nausea    Dizziness     .BP (!) 149/93   Pulse 78   Temp 36.6 °C (97.8 °F) (Temporal)   Resp 17   Ht 1.956 m (6' 5\")   Wt 116 kg (255 lb 4.7 oz)   SpO2 97%   BMI 30.27 kg/m²     "

## 2024-06-15 NOTE — ED NOTES
"Pt complains of headache x5 hours. \"Its the worst one I've ever head\" Pt reports they are sensitive to the light and smell, and is also experiencing nausea and dizziness.   "

## 2024-06-25 ENCOUNTER — APPOINTMENT (OUTPATIENT)
Dept: MEDICAL GROUP | Facility: MEDICAL CENTER | Age: 34
End: 2024-06-25
Attending: PHYSICIAN ASSISTANT
Payer: COMMERCIAL

## 2024-06-25 VITALS
SYSTOLIC BLOOD PRESSURE: 110 MMHG | WEIGHT: 250.8 LBS | OXYGEN SATURATION: 97 % | TEMPERATURE: 98.3 F | DIASTOLIC BLOOD PRESSURE: 66 MMHG | HEART RATE: 78 BPM | RESPIRATION RATE: 16 BRPM | BODY MASS INDEX: 29.61 KG/M2 | HEIGHT: 77 IN

## 2024-06-25 DIAGNOSIS — Z11.4 ENCOUNTER FOR SCREENING FOR HIV: ICD-10-CM

## 2024-06-25 DIAGNOSIS — R53.82 CHRONIC FATIGUE: ICD-10-CM

## 2024-06-25 DIAGNOSIS — Z11.59 ENCOUNTER FOR HEPATITIS C SCREENING TEST FOR LOW RISK PATIENT: ICD-10-CM

## 2024-06-25 DIAGNOSIS — M54.50 ACUTE BILATERAL LOW BACK PAIN WITHOUT SCIATICA: ICD-10-CM

## 2024-06-25 DIAGNOSIS — Z00.00 PREVENTATIVE HEALTH CARE: ICD-10-CM

## 2024-06-25 DIAGNOSIS — G43.101 MIGRAINE WITH AURA AND WITH STATUS MIGRAINOSUS, NOT INTRACTABLE: ICD-10-CM

## 2024-06-25 PROCEDURE — 3074F SYST BP LT 130 MM HG: CPT | Performed by: PHYSICIAN ASSISTANT

## 2024-06-25 PROCEDURE — 3078F DIAST BP <80 MM HG: CPT | Performed by: PHYSICIAN ASSISTANT

## 2024-06-25 PROCEDURE — 99214 OFFICE O/P EST MOD 30 MIN: CPT | Performed by: PHYSICIAN ASSISTANT

## 2024-06-25 RX ORDER — CHLORAL HYDRATE 500 MG
1000 CAPSULE ORAL
COMMUNITY

## 2024-06-25 RX ORDER — IBUPROFEN 800 MG/1
800 TABLET ORAL EVERY 8 HOURS PRN
Qty: 90 TABLET | Refills: 0 | Status: SHIPPED | OUTPATIENT
Start: 2024-06-25 | End: 2024-07-25

## 2024-06-25 RX ORDER — SUMATRIPTAN 50 MG/1
50 TABLET, FILM COATED ORAL
Qty: 10 TABLET | Refills: 3 | Status: SHIPPED | OUTPATIENT
Start: 2024-06-25

## 2024-06-25 ASSESSMENT — PATIENT HEALTH QUESTIONNAIRE - PHQ9: CLINICAL INTERPRETATION OF PHQ2 SCORE: 0

## 2024-06-25 ASSESSMENT — FIBROSIS 4 INDEX: FIB4 SCORE: 0.76

## 2024-06-25 NOTE — PROGRESS NOTES
Subjective:     History of Present Illness  The patient is a 33-year-old male who presents for evaluation of multiple medical concerns.    The patient has been experiencing headaches for the past three weeks, predominantly located in the temples and forehead. He has been diagnosed with chronic migraines, however, these headaches have been associated with nausea, photophobia, and occasional visual disturbances such as spots or floaters. The patient has been managing his symptoms with Tylenol.    The patient experienced a back injury yesterday while lifting a 5-pound box. He has been managing the pain with ice and stretching exercises. He denies experiencing any pain radiating down the legs.    Supplemental Information  He takes fish oil. He has no history of surgeries.   The patient denies smoking, alcohol intake, or drug use. He is  and has no kids. 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   He had chickenpox vaccine in his youth. He had tetanus vaccine earlier this year.      Current medicines (including changes today)  Current Outpatient Medications   Medication Sig Dispense Refill    Omega-3 Fatty Acids (FISH OIL) 1000 MG Cap capsule Take 1,000 mg by mouth 3 times a day with meals.      ibuprofen (MOTRIN) 800 MG Tab Take 1 Tablet by mouth every 8 hours as needed for Moderate Pain or Headache for up to 30 days. 90 Tablet 0    SUMAtriptan (IMITREX) 50 MG Tab Take 1 Tablet by mouth one time as needed for Migraine for up to 1 dose. May take second tablet 2 hours after first if still with headache. 10 Tablet 3     No current facility-administered medications for this visit.     He  has no past medical history on file.    ROS   No chest pain, no shortness of breath, no abdominal pain  Positive ROS as per HPI.  All other systems reviewed and are negative.     Objective:     /66 (BP Location: Left arm, Patient Position: Sitting, BP Cuff Size: Small adult)   Pulse 78   Temp 36.8 °C (98.3 °F) (Temporal)   " Resp 16   Ht 1.956 m (6' 5\")   Wt 114 kg (250 lb 12.8 oz)   SpO2 97%  Body mass index is 29.74 kg/m².   Physical Exam    Constitutional: Alert, no distress.  Skin: Warm, dry, good turgor, no rashes in visible areas.  Eye: Equal, round and reactive, conjunctiva clear, lids normal.  ENMT: Lips without lesions, good dentition, oropharynx clear.  Neck: Trachea midline, no masses, no thyromegaly.   Psych: Alert and oriented x3, normal affect and mood.      Results          Assessment and Plan:   The following treatment plan was discussed    Assessment & Plan  1. Migraines.  The patient's migraines, a chronic condition, has recently exacerbated. The use of ibuprofen and over-the-counter Excedrin Migraine were discussed. Imitrex was prescribed to be taken as directed, but it may induce drowsiness. A referral to neurology, as per the patient's request, was made.    2. Acute low back pain without sciatica.  The patient's condition is chronic, intermittent, and is stable today. The patient is advised to continue exercising, applying ice or heat, alternating with heat and then ice, walking, and refraining from lying down for extended periods. If the symptoms persist, physical therapy or chiropractic services were suggested. A muscle relaxer was offered, but the patient's symptoms were not severe. The patient will continue to work on core strengthening exercises.    3. Chronic fatigue.  Testosterone levels and other labs have been ordered, with fasting hours.    4. Preventative healthcare.  HIV and hepatitis C screening have been ordered. Other preventative labs have been ordered, with a fast of 8 hours, performed between 7 and 9 AM. The patient will be contacted with the results.    Follow-up  The patient is scheduled for a follow-up visit for an annual in 1 year.      ORDERS:  1. Migraine with aura and with status migrainosus, not intractable    - ibuprofen (MOTRIN) 800 MG Tab; Take 1 Tablet by mouth every 8 hours as " needed for Moderate Pain or Headache for up to 30 days.  Dispense: 90 Tablet; Refill: 0  - SUMAtriptan (IMITREX) 50 MG Tab; Take 1 Tablet by mouth one time as needed for Migraine for up to 1 dose. May take second tablet 2 hours after first if still with headache.  Dispense: 10 Tablet; Refill: 3  - Referral to Neurology    2. Acute bilateral low back pain without sciatica      3. Chronic fatigue    - Testosterone, Free & Total, Adult Male (w/SHBG); Future    4. Preventative health care    - CBC WITH DIFFERENTIAL; Future  - Comp Metabolic Panel; Future  - Lipid Profile; Future  - HEMOGLOBIN A1C; Future  - TSH WITH REFLEX TO FT4; Future  - VITAMIN D,25 HYDROXY (DEFICIENCY); Future  - Testosterone, Free & Total, Adult Male (w/SHBG); Future  - HIV AG/AB COMBO ASSAY SCREENING; Future  - HEP C VIRUS ANTIBODY; Future    5. Encounter for screening for HIV    - HIV AG/AB COMBO ASSAY SCREENING; Future    6. Encounter for hepatitis C screening test for low risk patient    - HEP C VIRUS ANTIBODY; Future        Please note that this dictation was created using voice recognition software. I have made every reasonable attempt to correct obvious errors, but I expect that there are errors of grammar and possibly content that I did not discover before finalizing the note.      Attestation      Verbal consent was acquired by the patient to use [employer] ambient listening note generation during this visit Yes

## 2024-07-02 ENCOUNTER — HOSPITAL ENCOUNTER (OUTPATIENT)
Dept: LAB | Facility: MEDICAL CENTER | Age: 34
End: 2024-07-02
Attending: PHYSICIAN ASSISTANT
Payer: COMMERCIAL

## 2024-07-02 DIAGNOSIS — Z11.4 ENCOUNTER FOR SCREENING FOR HIV: ICD-10-CM

## 2024-07-02 DIAGNOSIS — R53.82 CHRONIC FATIGUE: ICD-10-CM

## 2024-07-02 DIAGNOSIS — Z11.59 ENCOUNTER FOR HEPATITIS C SCREENING TEST FOR LOW RISK PATIENT: ICD-10-CM

## 2024-07-02 DIAGNOSIS — Z00.00 PREVENTATIVE HEALTH CARE: ICD-10-CM

## 2024-07-02 LAB
25(OH)D3 SERPL-MCNC: 28 NG/ML (ref 30–100)
ALBUMIN SERPL BCP-MCNC: 4.5 G/DL (ref 3.2–4.9)
ALBUMIN/GLOB SERPL: 1.7 G/DL
ALP SERPL-CCNC: 105 U/L (ref 30–99)
ALT SERPL-CCNC: 26 U/L (ref 2–50)
ANION GAP SERPL CALC-SCNC: 12 MMOL/L (ref 7–16)
AST SERPL-CCNC: 20 U/L (ref 12–45)
BASOPHILS # BLD AUTO: 0.6 % (ref 0–1.8)
BASOPHILS # BLD: 0.03 K/UL (ref 0–0.12)
BILIRUB SERPL-MCNC: 0.5 MG/DL (ref 0.1–1.5)
BUN SERPL-MCNC: 17 MG/DL (ref 8–22)
CALCIUM ALBUM COR SERPL-MCNC: 9.5 MG/DL (ref 8.5–10.5)
CALCIUM SERPL-MCNC: 9.9 MG/DL (ref 8.5–10.5)
CHLORIDE SERPL-SCNC: 104 MMOL/L (ref 96–112)
CHOLEST SERPL-MCNC: 171 MG/DL (ref 100–199)
CO2 SERPL-SCNC: 23 MMOL/L (ref 20–33)
CREAT SERPL-MCNC: 1.01 MG/DL (ref 0.5–1.4)
EOSINOPHIL # BLD AUTO: 0.16 K/UL (ref 0–0.51)
EOSINOPHIL NFR BLD: 3.2 % (ref 0–6.9)
ERYTHROCYTE [DISTWIDTH] IN BLOOD BY AUTOMATED COUNT: 40.2 FL (ref 35.9–50)
EST. AVERAGE GLUCOSE BLD GHB EST-MCNC: 108 MG/DL
FASTING STATUS PATIENT QL REPORTED: NORMAL
GFR SERPLBLD CREATININE-BSD FMLA CKD-EPI: 101 ML/MIN/1.73 M 2
GLOBULIN SER CALC-MCNC: 2.6 G/DL (ref 1.9–3.5)
GLUCOSE SERPL-MCNC: 98 MG/DL (ref 65–99)
HBA1C MFR BLD: 5.4 % (ref 4–5.6)
HCT VFR BLD AUTO: 48.8 % (ref 42–52)
HCV AB SER QL: NORMAL
HDLC SERPL-MCNC: 29 MG/DL
HGB BLD-MCNC: 16.7 G/DL (ref 14–18)
HIV 1+2 AB+HIV1 P24 AG SERPL QL IA: NORMAL
IMM GRANULOCYTES # BLD AUTO: 0.02 K/UL (ref 0–0.11)
IMM GRANULOCYTES NFR BLD AUTO: 0.4 % (ref 0–0.9)
LDLC SERPL CALC-MCNC: 124 MG/DL
LYMPHOCYTES # BLD AUTO: 1.76 K/UL (ref 1–4.8)
LYMPHOCYTES NFR BLD: 35.1 % (ref 22–41)
MCH RBC QN AUTO: 29.9 PG (ref 27–33)
MCHC RBC AUTO-ENTMCNC: 34.2 G/DL (ref 32.3–36.5)
MCV RBC AUTO: 87.5 FL (ref 81.4–97.8)
MONOCYTES # BLD AUTO: 0.34 K/UL (ref 0–0.85)
MONOCYTES NFR BLD AUTO: 6.8 % (ref 0–13.4)
NEUTROPHILS # BLD AUTO: 2.71 K/UL (ref 1.82–7.42)
NEUTROPHILS NFR BLD: 53.9 % (ref 44–72)
NRBC # BLD AUTO: 0 K/UL
NRBC BLD-RTO: 0 /100 WBC (ref 0–0.2)
PLATELET # BLD AUTO: 166 K/UL (ref 164–446)
PMV BLD AUTO: 10.1 FL (ref 9–12.9)
POTASSIUM SERPL-SCNC: 4.2 MMOL/L (ref 3.6–5.5)
PROT SERPL-MCNC: 7.1 G/DL (ref 6–8.2)
RBC # BLD AUTO: 5.58 M/UL (ref 4.7–6.1)
SODIUM SERPL-SCNC: 139 MMOL/L (ref 135–145)
TRIGL SERPL-MCNC: 92 MG/DL (ref 0–149)
TSH SERPL DL<=0.005 MIU/L-ACNC: 1.26 UIU/ML (ref 0.38–5.33)
WBC # BLD AUTO: 5 K/UL (ref 4.8–10.8)

## 2024-07-02 PROCEDURE — 84270 ASSAY OF SEX HORMONE GLOBUL: CPT

## 2024-07-02 PROCEDURE — 84403 ASSAY OF TOTAL TESTOSTERONE: CPT

## 2024-07-02 PROCEDURE — 80061 LIPID PANEL: CPT

## 2024-07-02 PROCEDURE — 36415 COLL VENOUS BLD VENIPUNCTURE: CPT

## 2024-07-02 PROCEDURE — 84443 ASSAY THYROID STIM HORMONE: CPT

## 2024-07-02 PROCEDURE — 85025 COMPLETE CBC W/AUTO DIFF WBC: CPT

## 2024-07-02 PROCEDURE — 87389 HIV-1 AG W/HIV-1&-2 AB AG IA: CPT

## 2024-07-02 PROCEDURE — 83036 HEMOGLOBIN GLYCOSYLATED A1C: CPT

## 2024-07-02 PROCEDURE — 86803 HEPATITIS C AB TEST: CPT

## 2024-07-02 PROCEDURE — 80053 COMPREHEN METABOLIC PANEL: CPT

## 2024-07-02 PROCEDURE — 82306 VITAMIN D 25 HYDROXY: CPT

## 2024-07-02 PROCEDURE — 84402 ASSAY OF FREE TESTOSTERONE: CPT

## 2024-07-05 LAB
SHBG SERPL-SCNC: 20 NMOL/L (ref 17–56)
TESTOST FREE MFR SERPL: 2.3 % (ref 1.6–2.9)
TESTOST FREE SERPL-MCNC: 82 PG/ML (ref 47–244)
TESTOST SERPL-MCNC: 355 NG/DL (ref 300–1080)

## 2024-07-08 ENCOUNTER — PATIENT MESSAGE (OUTPATIENT)
Dept: MEDICAL GROUP | Facility: MEDICAL CENTER | Age: 34
End: 2024-07-08
Payer: COMMERCIAL

## 2024-07-08 DIAGNOSIS — R79.89 LOW TESTOSTERONE: ICD-10-CM

## 2024-08-20 ENCOUNTER — HOSPITAL ENCOUNTER (OUTPATIENT)
Dept: LAB | Facility: MEDICAL CENTER | Age: 34
End: 2024-08-20
Attending: PHYSICIAN ASSISTANT
Payer: COMMERCIAL

## 2024-08-20 DIAGNOSIS — R79.89 LOW TESTOSTERONE: ICD-10-CM

## 2024-08-20 PROCEDURE — 36415 COLL VENOUS BLD VENIPUNCTURE: CPT

## 2024-08-20 PROCEDURE — 84403 ASSAY OF TOTAL TESTOSTERONE: CPT

## 2024-08-20 PROCEDURE — 84270 ASSAY OF SEX HORMONE GLOBUL: CPT

## 2024-08-20 PROCEDURE — 84402 ASSAY OF FREE TESTOSTERONE: CPT

## 2024-08-23 DIAGNOSIS — R79.89 LOW TESTOSTERONE: ICD-10-CM

## 2024-08-23 LAB
SHBG SERPL-SCNC: 21 NMOL/L (ref 17–56)
TESTOST FREE MFR SERPL: 2.2 % (ref 1.6–2.9)
TESTOST FREE SERPL-MCNC: 61 PG/ML (ref 47–244)
TESTOST SERPL-MCNC: 276 NG/DL (ref 300–1080)

## 2024-09-25 ENCOUNTER — HOSPITAL ENCOUNTER (OUTPATIENT)
Dept: LAB | Facility: MEDICAL CENTER | Age: 34
End: 2024-09-25
Attending: PHYSICIAN ASSISTANT
Payer: COMMERCIAL

## 2024-09-25 DIAGNOSIS — R79.89 LOW TESTOSTERONE: ICD-10-CM

## 2024-09-25 PROCEDURE — 84403 ASSAY OF TOTAL TESTOSTERONE: CPT

## 2024-09-25 PROCEDURE — 84270 ASSAY OF SEX HORMONE GLOBUL: CPT

## 2024-09-25 PROCEDURE — 84402 ASSAY OF FREE TESTOSTERONE: CPT

## 2024-09-25 PROCEDURE — 36415 COLL VENOUS BLD VENIPUNCTURE: CPT

## 2024-09-27 LAB
SHBG SERPL-SCNC: 20 NMOL/L (ref 17–56)
TESTOST FREE MFR SERPL: 2.3 % (ref 1.6–2.9)
TESTOST FREE SERPL-MCNC: 71 PG/ML (ref 47–244)
TESTOST SERPL-MCNC: 312 NG/DL (ref 300–1080)

## 2024-09-30 DIAGNOSIS — R79.89 LOW TESTOSTERONE: ICD-10-CM

## 2025-01-06 ENCOUNTER — OFFICE VISIT (OUTPATIENT)
Dept: URGENT CARE | Facility: CLINIC | Age: 35
End: 2025-01-06
Payer: COMMERCIAL

## 2025-01-06 VITALS
HEIGHT: 77 IN | RESPIRATION RATE: 12 BRPM | TEMPERATURE: 98 F | HEART RATE: 71 BPM | OXYGEN SATURATION: 98 % | SYSTOLIC BLOOD PRESSURE: 116 MMHG | DIASTOLIC BLOOD PRESSURE: 82 MMHG | BODY MASS INDEX: 32 KG/M2 | WEIGHT: 271 LBS

## 2025-01-06 DIAGNOSIS — L08.9 INFECTED PIERCED LIP: ICD-10-CM

## 2025-01-06 DIAGNOSIS — S01.531A INFECTED PIERCED LIP: ICD-10-CM

## 2025-01-06 PROCEDURE — 99213 OFFICE O/P EST LOW 20 MIN: CPT | Performed by: PHYSICIAN ASSISTANT

## 2025-01-06 PROCEDURE — 3079F DIAST BP 80-89 MM HG: CPT | Performed by: PHYSICIAN ASSISTANT

## 2025-01-06 PROCEDURE — 3074F SYST BP LT 130 MM HG: CPT | Performed by: PHYSICIAN ASSISTANT

## 2025-01-06 RX ORDER — RIMEGEPANT SULFATE 75 MG/75MG
75 TABLET, ORALLY DISINTEGRATING ORAL
COMMUNITY

## 2025-01-06 ASSESSMENT — ENCOUNTER SYMPTOMS
FEVER: 0
EYE REDNESS: 0
NAUSEA: 0
CHILLS: 1
EYE DISCHARGE: 0
VOMITING: 0

## 2025-01-06 ASSESSMENT — FIBROSIS 4 INDEX: FIB4 SCORE: 0.76

## 2025-01-07 NOTE — PROGRESS NOTES
Subjective     Gurmeet Meyer is a 34 y.o. male who presents with Wound Infection (Pierced x1 yr ago, on mouth lip left side)          This is a new problem.  The patient presents to clinic complaining of an infected lip piercing to his left lower lip x 2 days.  The patient states he had his lip pierced approximately 1 year ago.  The patient states 2 days ago he developed pain, swelling, redness, and discharge/drainage from the piercing site.  The patient states he subsequently removed the jewelry.  The patient reports a possible fever with associated cold sweats.  The patient reports no history of a previous piercing infection and/or piercing reaction.  The patient states he has not worn any new jewelry.  The patient has taken OTC Motrin for his current symptoms.  All    Wound Infection  Associated symptoms include chills. Pertinent negatives include no fever, nausea or vomiting.     PMH:  has no past medical history on file.  MEDS:   Current Outpatient Medications:     Rimegepant Sulfate (NURTEC) 75 MG TABLET DISPERSIBLE, Take 75 mg by mouth., Disp: , Rfl:     Omega-3 Fatty Acids (FISH OIL) 1000 MG Cap capsule, Take 1,000 mg by mouth 3 times a day with meals. (Patient not taking: Reported on 1/6/2025), Disp: , Rfl:     SUMAtriptan (IMITREX) 50 MG Tab, Take 1 Tablet by mouth one time as needed for Migraine for up to 1 dose. May take second tablet 2 hours after first if still with headache. (Patient not taking: Reported on 1/6/2025), Disp: 10 Tablet, Rfl: 3  ALLERGIES: No Known Allergies  SURGHX: No past surgical history on file.  SOCHX:  reports that he has quit smoking. His smoking use included cigarettes. He has never used smokeless tobacco. He reports that he does not currently use drugs. He reports that he does not drink alcohol.  FH: Family history was reviewed, no pertinent findings to report      Review of Systems   Constitutional:  Positive for chills. Negative for fever.   Eyes:  Negative for  "discharge and redness.   Gastrointestinal:  Negative for nausea and vomiting.              Objective     /82 (BP Location: Left arm, Patient Position: Sitting, BP Cuff Size: Large adult)   Pulse 71   Temp 36.7 °C (98 °F) (Temporal)   Resp 12   Ht 1.956 m (6' 5\")   Wt 123 kg (271 lb)   SpO2 98%   BMI 32.14 kg/m²      Physical Exam  Constitutional:       General: He is not in acute distress.     Appearance: Normal appearance. He is well-developed. He is not ill-appearing.   HENT:      Head: Normocephalic and atraumatic.      Right Ear: External ear normal.      Left Ear: External ear normal.      Mouth/Throat:        Comments:   A localized area of tenderness and edema with overlying erythema is present to the left lower lip with a scab overlying the external piercing site.  No increased warmth.  No active discharge/drainage.  No palpable induration.  No palpable fluctuance.  An exudate is present overlying the inner mucosal piercing site.  Eyes:      Extraocular Movements: Extraocular movements intact.      Conjunctiva/sclera: Conjunctivae normal.   Cardiovascular:      Rate and Rhythm: Normal rate.   Pulmonary:      Effort: Pulmonary effort is normal.   Musculoskeletal:      Cervical back: Normal range of motion and neck supple.   Skin:     General: Skin is warm and dry.   Neurological:      Mental Status: He is alert and oriented to person, place, and time.                             Assessment & Plan        Assessment & Plan  Infected pierced lip    Orders:    amoxicillin-clavulanate (AUGMENTIN) 875-125 MG Tab; Take 1 Tablet by mouth 2 times a day for 7 days.      The patient's presenting symptoms and physical exam findings are consistent with an infected pierced lip.  Will prescribe the patient Augmentin for his localized infection.  Advised the patient to monitor worsening signs or symptoms.  Recommend OTC medications and supportive care for symptomatic management.  Recommend patient follow-up with " primary care as needed.  Discussed return precautions with the patient, and he verbalized understanding.    Differential diagnoses, supportive care, and indications for immediate follow-up discussed with patient.   Instructed to return to clinic or nearest emergency department for any change in condition, further concerns, or worsening of symptoms.    I personally reviewed prior external notes and test results pertinent to today's visit.  I have independently reviewed and interpreted all diagnostics ordered during this urgent care visit.     Please note that this dictation was created using voice recognition software. I have made every reasonable attempt to correct obvious errors, but I expect that there may be errors of grammar and possibly content that I did not discover before finalizing the note.     This note was electronically signed by Jerri Valdivia PA-C

## 2025-02-28 ENCOUNTER — OFFICE VISIT (OUTPATIENT)
Dept: URGENT CARE | Facility: CLINIC | Age: 35
End: 2025-02-28
Payer: COMMERCIAL

## 2025-02-28 VITALS
WEIGHT: 267 LBS | SYSTOLIC BLOOD PRESSURE: 120 MMHG | RESPIRATION RATE: 18 BRPM | HEIGHT: 77 IN | HEART RATE: 64 BPM | BODY MASS INDEX: 31.53 KG/M2 | OXYGEN SATURATION: 99 % | TEMPERATURE: 98.9 F | DIASTOLIC BLOOD PRESSURE: 80 MMHG

## 2025-02-28 DIAGNOSIS — K21.9 GASTROESOPHAGEAL REFLUX DISEASE, UNSPECIFIED WHETHER ESOPHAGITIS PRESENT: ICD-10-CM

## 2025-02-28 DIAGNOSIS — R12 HEART BURN: ICD-10-CM

## 2025-02-28 DIAGNOSIS — R07.89 NON-CARDIAC CHEST PAIN: ICD-10-CM

## 2025-02-28 RX ORDER — OMEPRAZOLE 20 MG/1
20 TABLET, DELAYED RELEASE ORAL DAILY
Qty: 30 TABLET | Refills: 0 | Status: SHIPPED | OUTPATIENT
Start: 2025-02-28

## 2025-02-28 RX ORDER — ALUMINA, MAGNESIA, AND SIMETHICONE 2400; 2400; 240 MG/30ML; MG/30ML; MG/30ML
10 SUSPENSION ORAL 4 TIMES DAILY PRN
Qty: 355 ML | Refills: 0 | Status: SHIPPED | OUTPATIENT
Start: 2025-02-28

## 2025-02-28 ASSESSMENT — ENCOUNTER SYMPTOMS
SPUTUM PRODUCTION: 0
CLAUDICATION: 0
COUGH: 0
SHORTNESS OF BREATH: 0
SORE THROAT: 1
WHEEZING: 0
ABDOMINAL PAIN: 0
CONSTIPATION: 0
BLOOD IN STOOL: 0
HEMOPTYSIS: 0
NAUSEA: 0
VOMITING: 0
HEADACHES: 0
DIARRHEA: 0
PND: 0
ORTHOPNEA: 0
CHILLS: 0
PALPITATIONS: 0
FEVER: 0
HEARTBURN: 1

## 2025-02-28 ASSESSMENT — FIBROSIS 4 INDEX: FIB4 SCORE: 0.76

## 2025-02-28 NOTE — PROGRESS NOTES
Subjective:   David Meyer is a 34 y.o. male who presents for Heartburn (X 2 weeks)      Patient presents with complaints of heartburn that has been going on for 2 weeks.  States that his symptoms started off with slight discomfort after eating, though have progressed to being nearly persistent and have increased in severity.  Patient states he has been using OTC antacid to treat symptoms, recently stopped about 3 days ago and states that symptoms increased.  States that right around the time symptoms started he had just recently stopped smoking cigarettes and drinking alcohol.  He states that he does have some accompanied chest, back, and neck pain with symptoms.  He denies any diaphoresis, shortness of breath, wheezing, stridor, and no nausea or vomiting.  He denies any history of cardiac or pulmonary disease, though chart review does show that patient has a history of chest pain that has all subsequently been found to not be cardiac in etiology    Heartburn  Associated symptoms include chest pain and a sore throat. Pertinent negatives include no abdominal pain, chills, congestion, coughing, fever, headaches, nausea, rash or vomiting.       Review of Systems   Constitutional:  Negative for chills and fever.   HENT:  Positive for sore throat. Negative for congestion.    Respiratory:  Negative for cough, hemoptysis, sputum production, shortness of breath and wheezing.    Cardiovascular:  Positive for chest pain. Negative for palpitations, orthopnea, claudication, leg swelling and PND.   Gastrointestinal:  Positive for heartburn. Negative for abdominal pain, blood in stool, constipation, diarrhea, melena, nausea and vomiting.   Skin:  Negative for rash.   Neurological:  Negative for headaches.     Refer to HPI for additional details.    During this visit, appropriate PPE was worn, and hand hygiene was performed.    PMH:  has no past medical history on file.    MEDS:   Current Outpatient Medications:     mag  "hydrox-al hydrox-simeth (ANTACID/SIMETHICONE DS) 400-400-40 MG/5ML Suspension, Take 10 mL by mouth 4 times a day as needed (indegestion and acid reflux)., Disp: 355 mL, Rfl: 0    omeprazole (PRILOSEC OTC) 20 MG tablet, Take 1 Tablet by mouth every day., Disp: 30 Tablet, Rfl: 0    Rimegepant Sulfate (NURTEC) 75 MG TABLET DISPERSIBLE, Take 75 mg by mouth., Disp: , Rfl:     Omega-3 Fatty Acids (FISH OIL) 1000 MG Cap capsule, Take 1,000 mg by mouth 3 times a day with meals. (Patient not taking: Reported on 2/28/2025), Disp: , Rfl:     SUMAtriptan (IMITREX) 50 MG Tab, Take 1 Tablet by mouth one time as needed for Migraine for up to 1 dose. May take second tablet 2 hours after first if still with headache. (Patient not taking: Reported on 2/28/2025), Disp: 10 Tablet, Rfl: 3    ALLERGIES: No Known Allergies  SURGHX: History reviewed. No pertinent surgical history.  SOCHX:  reports that he has quit smoking. His smoking use included cigarettes. He has never used smokeless tobacco. He reports that he does not currently use drugs. He reports that he does not drink alcohol.    FH: Per HPI as applicable/pertinent.    Medications, Allergies, and current problem list reviewed today in Epic.     Objective:     /80 (BP Location: Left arm, Patient Position: Sitting, BP Cuff Size: Large adult)   Pulse 64   Temp 37.2 °C (98.9 °F)   Resp 18   Ht 1.956 m (6' 5\")   Wt 121 kg (267 lb)   SpO2 99%     Physical Exam    Assessment/Plan:     Diagnosis and associated orders:     1. Gastroesophageal reflux disease, unspecified whether esophagitis present  - omeprazole (PRILOSEC OTC) 20 MG tablet; Take 1 Tablet by mouth every day.  Dispense: 30 Tablet; Refill: 0    2. Heart burn  - EKG - Clinic Performed  - mag hydrox-al hydrox-simeth (ANTACID/SIMETHICONE DS) 400-400-40 MG/5ML Suspension; Take 10 mL by mouth 4 times a day as needed (indegestion and acid reflux).  Dispense: 355 mL; Refill: 0  - omeprazole (PRILOSEC OTC) 20 MG tablet; " Take 1 Tablet by mouth every day.  Dispense: 30 Tablet; Refill: 0    3. Non-cardiac chest pain  - EKG - Clinic Performed     Comments/MDM:     Patient history and physical exam is consistent with acute heartburn with concomitant chest pain.  Patient's symptoms patient did present with red flag symptoms and was subsequently promptly assessed.  Found to be currently stable and no acute imminent distress.  I discussed HPI and physical exam with patient, though it seems that his symptoms symptoms are consistent with gastric reflux as well as heartburn, he does endorse abnormal symptoms of chest neck and back pain that are concomitant with heartburn.  For this reason I suggested doing EKG to ensure no acute cardiac process.  Patient on board  In clinic EKG showed regular sinus rhythm at a rate of 61.  No ST elevation, no T wave abnormalities, no ectopy or any other abnormal findings.  In clinic GI cocktail was given, patient stated it afforded him good relief  Outpatient management will consist of daily omeprazole, as needed Tums and Maalox for breakthrough reflux/heartburn, small frequent meals, adequate hydration, avoid citrusy or acidic and spicy foods, monitor symptoms  Follow up in 3-5 days if no improvement in symptoms  Follow-up with PCP for further management and evaluation    ED precautions given for new or worsening symptoms  Discussion and collaborative decision making used with the patient myself to develop treatment plan.  Patient understands the treatment plan of care, and further follow-up if needed.  No further questions           Differential diagnosis, natural history, supportive care, and indications for immediate follow-up discussed.    Advised the patient to follow-up with the primary care physician for recheck, reevaluation, and consideration of further management.    Please note that this dictation was created using voice recognition software. I have made a reasonable attempt to correct obvious  errors, but I expect that there are errors of grammar and possibly content that I did not discover before finalizing the note.    This note was electronically signed by MALIA Xiao

## 2025-03-06 ENCOUNTER — OFFICE VISIT (OUTPATIENT)
Dept: URGENT CARE | Facility: CLINIC | Age: 35
End: 2025-03-06
Payer: COMMERCIAL

## 2025-03-06 VITALS
OXYGEN SATURATION: 96 % | DIASTOLIC BLOOD PRESSURE: 78 MMHG | SYSTOLIC BLOOD PRESSURE: 122 MMHG | HEIGHT: 77 IN | WEIGHT: 267 LBS | TEMPERATURE: 98 F | RESPIRATION RATE: 15 BRPM | HEART RATE: 64 BPM | BODY MASS INDEX: 31.53 KG/M2

## 2025-03-06 DIAGNOSIS — J01.00 ACUTE NON-RECURRENT MAXILLARY SINUSITIS: ICD-10-CM

## 2025-03-06 PROCEDURE — 99213 OFFICE O/P EST LOW 20 MIN: CPT

## 2025-03-06 PROCEDURE — 3078F DIAST BP <80 MM HG: CPT

## 2025-03-06 PROCEDURE — 3074F SYST BP LT 130 MM HG: CPT

## 2025-03-06 RX ORDER — CETIRIZINE HYDROCHLORIDE 10 MG/1
10 TABLET ORAL DAILY
Qty: 30 TABLET | Refills: 0 | Status: SHIPPED | OUTPATIENT
Start: 2025-03-06

## 2025-03-06 RX ORDER — FLUTICASONE PROPIONATE 50 MCG
1 SPRAY, SUSPENSION (ML) NASAL DAILY
Qty: 16 G | Refills: 0 | Status: SHIPPED | OUTPATIENT
Start: 2025-03-06

## 2025-03-06 ASSESSMENT — ENCOUNTER SYMPTOMS
SPUTUM PRODUCTION: 0
ABDOMINAL PAIN: 0
EYE DISCHARGE: 0
FEVER: 0
SINUS PAIN: 1
SHORTNESS OF BREATH: 0
CHILLS: 0
COUGH: 0
VOMITING: 0
WHEEZING: 0
EYE PAIN: 0
DIARRHEA: 0
SORE THROAT: 1
STRIDOR: 0
NAUSEA: 0
EYE REDNESS: 0
NECK PAIN: 0
HEADACHES: 1

## 2025-03-06 ASSESSMENT — FIBROSIS 4 INDEX: FIB4 SCORE: 0.76

## 2025-03-06 NOTE — PROGRESS NOTES
Subjective:     Chief Complaint   Patient presents with    Sinus Problem       HPI:  David Meyer is a 34 y.o. male who presents for  14 days of nasal congestion, fever, chills, headache, and  sinus pressure. He denies associated cough, difficulty breathing, confusion, nausea, vomiting or diarrhea. He has tried OTC cold/sinus medication at home without much improvement. Denies a history of seasonal allergies or sinus infections in the past. No known ill contacts at home. No recent antibiotic usage. Denies recent foreign travel, domestic travel, or known exposure to COVID-19.       ROS:  Review of Systems   Constitutional:  Positive for malaise/fatigue. Negative for chills and fever.   HENT:  Positive for ear pain, sinus pain and sore throat. Negative for congestion, ear discharge and hearing loss.    Eyes:  Negative for pain, discharge and redness.   Respiratory:  Negative for cough, sputum production, shortness of breath, wheezing and stridor.    Cardiovascular:  Negative for chest pain.   Gastrointestinal:  Negative for abdominal pain, diarrhea, nausea and vomiting.   Genitourinary:  Negative for dysuria.   Musculoskeletal:  Negative for neck pain.   Skin:  Negative for rash.   Neurological:  Positive for headaches.        CURRENT MEDICATIONS:  Current Outpatient Medications   Medication Sig Refill Last Dispense    mag hydrox-al hydrox-simeth (ANTACID/SIMETHICONE DS) 400-400-40 MG/5ML Suspension Take 10 mL by mouth 4 times a day as needed (indegestion and acid reflux). 0 Unknown (outside pharmacy)    omeprazole (PRILOSEC OTC) 20 MG tablet Take 1 Tablet by mouth every day. 0 Unknown (outside pharmacy)    Rimegepant Sulfate (NURTEC) 75 MG TABLET DISPERSIBLE Take 75 mg by mouth.  Unknown (patient-reported)       ALLERGIES:   No Known Allergies    PROBLEM LIST:    does not have any pertinent problems on file.    Allergies, Medications, & Tobacco/Substance Use were reconciled by the Medical Assistant and  reviewed by myself.     Objective:   There were no vitals taken for this visit.    Physical Exam  Constitutional:       General: He is not in acute distress.     Appearance: He is not ill-appearing or toxic-appearing.   HENT:      Right Ear: Tympanic membrane is bulging. Tympanic membrane is not erythematous.      Left Ear: Tympanic membrane is bulging. Tympanic membrane is not erythematous.      Nose:      Right Sinus: Maxillary sinus tenderness present.      Left Sinus: Maxillary sinus tenderness present.      Mouth/Throat:      Pharynx: Posterior oropharyngeal erythema present. No oropharyngeal exudate.   Cardiovascular:      Rate and Rhythm: Normal rate and regular rhythm.   Pulmonary:      Effort: Pulmonary effort is normal.      Breath sounds: Normal breath sounds.   Skin:     General: Skin is warm and dry.   Neurological:      Mental Status: He is alert.         Assessment/Plan:   Pt's history and physical exam consistent with sinusitis. Given reports of double worsening and symptoms persisting for greater than 10 days with minimal to no relief from over-the-counter medications, I do believe it is reasonable to begin antimicrobials at this time. No evidence of other bacterial infections including pneumonia, meningitis, pharyngitis, otitis media. Recommend adjunct therapy and supportive treatment.     Assessment & Plan  Acute non-recurrent maxillary sinusitis  Orders:    amoxicillin-clavulanate (AUGMENTIN) 875-125 MG Tab; Take 1 Tablet by mouth 2 times a day for 7 days.    fluticasone (FLONASE) 50 MCG/ACT nasal spray; Administer 1 Spray into affected nostril(S) every day.    cetirizine (ZYRTEC ALLERGY) 10 MG Tab; Take 1 Tablet by mouth every day.  - Encourage pt to take antibiotic as directed, potential side effects of medication discussed. Probiotic use encouraged.   - Encouraged pt to use flonase as directed. Can use OTC Netipot or other sinus washes.   - Follow-up with primary physician if having continued  symptoms.     Discussed differential diagnosis, management options, risks/benefits, and alternatives to planned treatment. Pt expressed understanding and the treatment plan was agreed upon. Questions were encouraged and answered. Pt encouraged to return to urgent care as needed if new or worsening symptoms or if there is no improvement in condition. Pt educated in red flags and indications to immediately call 911 or present to the Emergency Department. Advised the patient to follow-up with the primary care physician for recheck, reevaluation, and further management.    I personally reviewed prior external notes and test results pertinent to today's visit. I have independently reviewed and interpreted all diagnostics ordered during this visit.    Please note that this dictation was created using voice recognition software. I have made a reasonable attempt to correct obvious errors, but I expect that there are errors of grammar and possibly content that I did not discover before finalizing the note.    This note was electronically signed by MILTON Turner

## 2025-03-21 ENCOUNTER — OFFICE VISIT (OUTPATIENT)
Dept: MEDICAL GROUP | Facility: MEDICAL CENTER | Age: 35
End: 2025-03-21
Payer: COMMERCIAL

## 2025-03-21 VITALS
HEIGHT: 76 IN | DIASTOLIC BLOOD PRESSURE: 66 MMHG | WEIGHT: 263.4 LBS | HEART RATE: 63 BPM | SYSTOLIC BLOOD PRESSURE: 114 MMHG | TEMPERATURE: 98.6 F | OXYGEN SATURATION: 96 % | BODY MASS INDEX: 32.08 KG/M2

## 2025-03-21 DIAGNOSIS — Z00.00 PREVENTATIVE HEALTH CARE: ICD-10-CM

## 2025-03-21 DIAGNOSIS — K21.9 GASTROESOPHAGEAL REFLUX DISEASE, UNSPECIFIED WHETHER ESOPHAGITIS PRESENT: ICD-10-CM

## 2025-03-21 DIAGNOSIS — R07.9 CHEST PAIN, UNSPECIFIED TYPE: ICD-10-CM

## 2025-03-21 DIAGNOSIS — R79.89 LOW TESTOSTERONE: ICD-10-CM

## 2025-03-21 PROCEDURE — 3078F DIAST BP <80 MM HG: CPT | Performed by: PHYSICIAN ASSISTANT

## 2025-03-21 PROCEDURE — 3074F SYST BP LT 130 MM HG: CPT | Performed by: PHYSICIAN ASSISTANT

## 2025-03-21 PROCEDURE — 99214 OFFICE O/P EST MOD 30 MIN: CPT | Performed by: PHYSICIAN ASSISTANT

## 2025-03-21 RX ORDER — OMEPRAZOLE 20 MG/1
20 TABLET, DELAYED RELEASE ORAL 2 TIMES DAILY
Qty: 60 TABLET | Refills: 1 | Status: SHIPPED | OUTPATIENT
Start: 2025-03-21 | End: 2025-05-20

## 2025-03-21 RX ORDER — SUCRALFATE 1 G/1
1 TABLET ORAL
Qty: 120 TABLET | Refills: 2 | Status: SHIPPED | OUTPATIENT
Start: 2025-03-21 | End: 2025-06-19

## 2025-03-21 RX ORDER — FAMOTIDINE 20 MG/1
20 TABLET, FILM COATED ORAL NIGHTLY
Qty: 30 TABLET | Refills: 3 | Status: SHIPPED | OUTPATIENT
Start: 2025-03-21 | End: 2025-07-19

## 2025-03-21 ASSESSMENT — FIBROSIS 4 INDEX: FIB4 SCORE: 0.76

## 2025-03-21 ASSESSMENT — PATIENT HEALTH QUESTIONNAIRE - PHQ9: CLINICAL INTERPRETATION OF PHQ2 SCORE: 0

## 2025-03-21 NOTE — PROGRESS NOTES
Subjective:     History of Present Illness  The patient presents for evaluation of heartburn.    He has been experiencing persistent heartburn for the past 4 weeks, with no identifiable triggers. His symptoms are predominantly during the day, and he experiences reflux throughout the day. He also reports chest pain, which was particularly severe upon awakening yesterday morning, accompanied by significant back pain. Despite attempts to manage his symptoms through dietary modifications, including both large and small breakfasts, there has been no noticeable improvement. He has abstained from alcohol, cigarettes, and sugar, and has lost 13 pounds over the past 3 months. He sought medical attention at an urgent care facility 2 weeks ago, where he was prescribed omeprazole and a GI cocktail. He has been adhering to the prescribed regimen of omeprazole once daily in the morning.    Additionally, he reports experiencing dizziness, akin to vertigo, and is uncertain if this is related to his current condition.    He has consulted with a urologist regarding his testosterone levels, which were found to be low but not significantly so. He has a follow-up appointment scheduled with the urologist in 2 to 3 months to discuss potential treatment options.    Supplemental Information  He has been taking ibuprofen since he was 14 for his migraines.    SOCIAL HISTORY  He has cut out alcohol, cigarettes, and sugar. He does not use nicotine products.    MEDICATIONS  Current: Omeprazole.  Past: Ibuprofen.      Current medicines (including changes today)  Current Outpatient Medications   Medication Sig Dispense Refill    omeprazole (PRILOSEC OTC) 20 MG tablet Take 1 Tablet by mouth 2 times a day for 60 days. 1/2 hr prior to breakfast and dinner. 60 Tablet 1    famotidine (PEPCID) 20 MG Tab Take 1 Tablet by mouth every evening for 120 days. 30 Tablet 3    sucralfate (CARAFATE) 1 GM Tab Take 1 Tablet by mouth 4 Times a Day,Before Meals and  "at Bedtime for 90 days. 120 Tablet 2    fluticasone (FLONASE) 50 MCG/ACT nasal spray Administer 1 Spray into affected nostril(S) every day. 16 g 0    cetirizine (ZYRTEC ALLERGY) 10 MG Tab Take 1 Tablet by mouth every day. 30 Tablet 0    Rimegepant Sulfate (NURTEC) 75 MG TABLET DISPERSIBLE Take 75 mg by mouth.       No current facility-administered medications for this visit.     He  has no past medical history on file.    ROS   No shortness of breath.  Positive ROS as per HPI.  All other systems reviewed and are negative.     Objective:     /66 (BP Location: Left arm, Patient Position: Sitting, BP Cuff Size: Large adult)   Pulse 63   Temp 37 °C (98.6 °F) (Temporal)   Ht 1.925 m (6' 3.79\")   Wt 119 kg (263 lb 6.4 oz)   SpO2 96%  Body mass index is 32.24 kg/m².   Physical Exam    Constitutional: Alert, no distress.  Skin: Warm, dry, good turgor, no rashes in visible areas.  Eye: Equal, round and reactive, conjunctiva clear, lids normal.  ENMT: Lips without lesions, good dentition, oropharynx clear.  Neck: Trachea midline, no masses, no thyromegaly.   Psych: Alert and oriented x3, normal affect and mood.      Results          Assessment and Plan:   The following treatment plan was discussed    Assessment & Plan  1.  GERD.  New condition noted in chart. He has been experiencing heartburn for almost 4 weeks. He was previously given omeprazole and a GI cocktail at urgent care. He is advised to avoid spicy, greasy, and acidic foods, tomato products, sugar, alcohol, and chocolate. He should not eat 3 hours prior to bedtime. A prescription for omeprazole 20 mg twice daily, to be taken 30 minutes before breakfast and dinner, has been provided. Additionally, famotidine is prescribed to be taken 30 minutes before bedtime. A referral to a gastroenterologist has been made. If there is no improvement in his condition within 2 weeks, he is instructed to contact via email for further medication adjustments. If symptoms " persist, an upper endoscopy may be considered.    2. Dizziness.  Acute condition. He reports experiencing dizziness, described as a vertigo-like feeling with the room spinning. This symptom is not believed to be related to his heartburn.  Follow-up with labs.    3. Low testosterone.  He has a follow-up appointment with urology in 2-3 months to discuss potential treatment options. His testosterone levels will be checked during the upcoming lab tests.    4. Health maintenance.  A complete blood count, liver function test, kidney function test, cholesterol panel, and A1c for prediabetes will be ordered. He is instructed to fast for 8 hours prior to the lab tests, which should be conducted between 7:00 AM and 9:00 AM.      ORDERS:  1. Gastroesophageal reflux disease, unspecified whether esophagitis present    - omeprazole (PRILOSEC OTC) 20 MG tablet; Take 1 Tablet by mouth 2 times a day for 60 days. 1/2 hr prior to breakfast and dinner.  Dispense: 60 Tablet; Refill: 1  - famotidine (PEPCID) 20 MG Tab; Take 1 Tablet by mouth every evening for 120 days.  Dispense: 30 Tablet; Refill: 3  - Referral to Gastroenterology  - sucralfate (CARAFATE) 1 GM Tab; Take 1 Tablet by mouth 4 Times a Day,Before Meals and at Bedtime for 90 days.  Dispense: 120 Tablet; Refill: 2    2. Chest pain, unspecified type    - sucralfate (CARAFATE) 1 GM Tab; Take 1 Tablet by mouth 4 Times a Day,Before Meals and at Bedtime for 90 days.  Dispense: 120 Tablet; Refill: 2    3. Low testosterone    - Testosterone, Free & Total, Adult Male (w/SHBG); Future    4. Preventative health care    - CBC WITHOUT DIFFERENTIAL; Future  - Comp Metabolic Panel; Future  - Lipid Profile; Future  - HEMOGLOBIN A1C; Future  - Testosterone, Free & Total, Adult Male (w/SHBG); Future    Addendum: Also prescribed Carafate given his chest pain with associated reflux.  May have an ulcer.  Take as directed.  Vies over to take the PPI with H2 blocker first for the next 3 to 5 days  if no improvement then add Carafate.  Needs an appoint with GI.    Please note that this dictation was created using voice recognition software. I have made every reasonable attempt to correct obvious errors, but I expect that there are errors of grammar and possibly content that I did not discover before finalizing the note.      Attestation      Verbal consent was acquired by the patient to use [employer] ambient listening note generation during this visit Yes

## 2025-03-24 NOTE — Clinical Note
REFERRAL APPROVAL NOTICE         Sent on March 24, 2025                   Gurmeet Meyer  [address]  [place] [address]                   Dear Mr. Meyer,    After a careful review of the medical information and benefit coverage, [facility] has processed your referral. See below for additional details.    If applicable, you must be actively enrolled with your insurance for coverage of the authorized service. If you have any questions regarding your coverage, please contact your insurance directly.    REFERRAL INFORMATION   Referral #:  [id number]  Referred-To Provider    Referred-By Provider:  Gastroenterology    Jarad Cervantes P.A.-C.   [facility]      [address]  [address]  [place] [address]  [phone] [id number]  [email or web address] [address]  [phone]    Referral Start Date:  03/21/2025  Referral End Date:   03/21/2026             SCHEDULING  If you do not already have an appointment, please call [phone] to make an appointment.     MORE INFORMATION  If you do not already have a [employer] account, sign up at: [email or web address]  You can access your medical information, make appointments, see lab results, billing information, and more.  If you have questions regarding this referral, please contact  the [facility] Referrals department at:             [phone]. Monday - Friday 8:00AM - 5:00PM.     Sincerely,    [facility]

## 2025-08-12 ENCOUNTER — APPOINTMENT (OUTPATIENT)
Dept: URGENT CARE | Facility: CLINIC | Age: 35
End: 2025-08-12
Payer: COMMERCIAL

## 2025-08-13 ENCOUNTER — OFFICE VISIT (OUTPATIENT)
Dept: URGENT CARE | Facility: CLINIC | Age: 35
End: 2025-08-13
Payer: COMMERCIAL

## 2025-08-13 VITALS
BODY MASS INDEX: 33.06 KG/M2 | HEIGHT: 77 IN | RESPIRATION RATE: 16 BRPM | OXYGEN SATURATION: 97 % | DIASTOLIC BLOOD PRESSURE: 79 MMHG | HEART RATE: 79 BPM | SYSTOLIC BLOOD PRESSURE: 128 MMHG | WEIGHT: 280 LBS | TEMPERATURE: 96.8 F

## 2025-08-13 DIAGNOSIS — H66.002 ACUTE SUPPURATIVE OTITIS MEDIA OF LEFT EAR WITHOUT SPONTANEOUS RUPTURE OF TYMPANIC MEMBRANE, RECURRENCE NOT SPECIFIED: Primary | ICD-10-CM

## 2025-08-13 PROCEDURE — 99213 OFFICE O/P EST LOW 20 MIN: CPT | Performed by: NURSE PRACTITIONER

## 2025-08-13 PROCEDURE — 3078F DIAST BP <80 MM HG: CPT | Performed by: NURSE PRACTITIONER

## 2025-08-13 PROCEDURE — 3074F SYST BP LT 130 MM HG: CPT | Performed by: NURSE PRACTITIONER

## 2025-08-13 ASSESSMENT — FIBROSIS 4 INDEX: FIB4 SCORE: 0.76

## 2025-08-24 ENCOUNTER — OFFICE VISIT (OUTPATIENT)
Dept: URGENT CARE | Facility: CLINIC | Age: 35
End: 2025-08-24
Payer: COMMERCIAL

## 2025-08-24 VITALS
WEIGHT: 276 LBS | RESPIRATION RATE: 16 BRPM | OXYGEN SATURATION: 98 % | HEART RATE: 80 BPM | DIASTOLIC BLOOD PRESSURE: 74 MMHG | SYSTOLIC BLOOD PRESSURE: 126 MMHG | TEMPERATURE: 98 F | BODY MASS INDEX: 32.59 KG/M2 | HEIGHT: 77 IN

## 2025-08-24 DIAGNOSIS — T50.905A ADVERSE EFFECT OF DRUG, INITIAL ENCOUNTER: ICD-10-CM

## 2025-08-24 DIAGNOSIS — L50.9 HIVES: Primary | ICD-10-CM

## 2025-08-24 RX ORDER — DIPHENHYDRAMINE HYDROCHLORIDE 50 MG/ML
25 INJECTION, SOLUTION INTRAMUSCULAR; INTRAVENOUS ONCE
Status: COMPLETED | OUTPATIENT
Start: 2025-08-24 | End: 2025-08-24

## 2025-08-24 RX ORDER — PREDNISONE 20 MG/1
40 TABLET ORAL DAILY
Qty: 6 TABLET | Refills: 0 | Status: SHIPPED | OUTPATIENT
Start: 2025-08-24 | End: 2025-08-27

## 2025-08-24 RX ADMIN — DIPHENHYDRAMINE HYDROCHLORIDE 25 MG: 50 INJECTION, SOLUTION INTRAMUSCULAR; INTRAVENOUS at 12:13

## 2025-08-24 ASSESSMENT — LIFESTYLE VARIABLES
AUDIT-C TOTAL SCORE: 1
SKIP TO QUESTIONS 9-10: 1
HOW OFTEN DO YOU HAVE A DRINK CONTAINING ALCOHOL: MONTHLY OR LESS
HOW OFTEN DO YOU HAVE SIX OR MORE DRINKS ON ONE OCCASION: NEVER
HOW MANY STANDARD DRINKS CONTAINING ALCOHOL DO YOU HAVE ON A TYPICAL DAY: 1 OR 2

## 2025-08-24 ASSESSMENT — FIBROSIS 4 INDEX: FIB4 SCORE: 0.76
